# Patient Record
Sex: MALE | Race: WHITE | Employment: OTHER | URBAN - METROPOLITAN AREA
[De-identification: names, ages, dates, MRNs, and addresses within clinical notes are randomized per-mention and may not be internally consistent; named-entity substitution may affect disease eponyms.]

---

## 2017-05-04 PROBLEM — M79.642 LEFT HAND PAIN: Status: ACTIVE | Noted: 2017-05-04

## 2017-05-04 PROBLEM — M10.9 ACUTE GOUT OF LEFT HAND: Status: ACTIVE | Noted: 2017-05-04

## 2017-08-07 ENCOUNTER — HOSPITAL ENCOUNTER (OUTPATIENT)
Dept: CT IMAGING | Age: 72
Discharge: HOME | End: 2017-08-07
Attending: INTERNAL MEDICINE | Admitting: INTERNAL MEDICINE

## 2017-08-07 DIAGNOSIS — R52 PAIN: ICD-10-CM

## 2017-08-07 DIAGNOSIS — M13.10 MONOARTHRITIS, NOT ELSEWHERE CLASSIFIED, UNSPECIFIED SITE: ICD-10-CM

## 2017-08-07 DIAGNOSIS — M25.50 ARTHRALGIA, UNSPECIFIED JOINT: ICD-10-CM

## 2018-07-27 ENCOUNTER — HOSPITAL ENCOUNTER (OUTPATIENT)
Dept: WOMENS IMAGING | Age: 73
Discharge: HOME | End: 2018-07-27
Attending: FAMILY MEDICINE | Admitting: FAMILY MEDICINE

## 2018-07-27 DIAGNOSIS — M13.10: ICD-10-CM

## 2018-07-27 DIAGNOSIS — M81.0 OSTEOPOROSIS, UNSPECIFIED OSTEOPOROSIS TYPE, UNSPECIFIED PATHOLOGICAL FRACTURE PRESENCE: ICD-10-CM

## 2021-03-12 ENCOUNTER — HOSPITAL ENCOUNTER (OUTPATIENT)
Dept: CT IMAGING | Age: 76
Discharge: HOME | End: 2021-03-12
Payer: MEDICARE

## 2021-03-12 ENCOUNTER — HOSPITAL ENCOUNTER (OUTPATIENT)
Dept: WOMENS IMAGING | Age: 76
Discharge: HOME | End: 2021-03-12
Payer: MEDICARE

## 2021-03-12 DIAGNOSIS — Z00.00 MEDICARE ANNUAL WELLNESS VISIT, SUBSEQUENT: ICD-10-CM

## 2021-03-12 DIAGNOSIS — J44.9 OBSTRUCTIVE CHRONIC BRONCHITIS WITHOUT EXACERBATION (HCC): ICD-10-CM

## 2021-03-12 DIAGNOSIS — M81.0 OSTEOPOROSIS, UNSPECIFIED OSTEOPOROSIS TYPE, UNSPECIFIED PATHOLOGICAL FRACTURE PRESENCE: ICD-10-CM

## 2021-03-12 PROCEDURE — 77080 DXA BONE DENSITY AXIAL: CPT

## 2021-03-12 PROCEDURE — 71250 CT THORAX DX C-: CPT

## 2021-03-26 ENCOUNTER — TELEPHONE (OUTPATIENT)
Dept: PULMONOLOGY | Age: 76
End: 2021-03-26

## 2021-03-26 ENCOUNTER — HOSPITAL ENCOUNTER (OUTPATIENT)
Dept: PET IMAGING | Age: 76
Discharge: HOME | End: 2021-03-26
Payer: MEDICARE

## 2021-03-26 DIAGNOSIS — R91.8 LUNG NODULES: ICD-10-CM

## 2021-03-26 PROCEDURE — A9552 F18 FDG: HCPCS | Performed by: FAMILY MEDICINE

## 2021-03-26 PROCEDURE — 3430000000 HC RX DIAGNOSTIC RADIOPHARMACEUTICAL: Performed by: FAMILY MEDICINE

## 2021-03-26 PROCEDURE — 78815 PET IMAGE W/CT SKULL-THIGH: CPT

## 2021-03-26 RX ORDER — FLUDEOXYGLUCOSE F 18 200 MCI/ML
12.73 INJECTION, SOLUTION INTRAVENOUS
Status: COMPLETED | OUTPATIENT
Start: 2021-03-26 | End: 2021-03-26

## 2021-03-26 RX ADMIN — FLUDEOXYGLUCOSE F 18 12.73 MILLICURIE: 200 INJECTION, SOLUTION INTRAVENOUS at 08:04

## 2021-03-26 NOTE — TELEPHONE ENCOUNTER
Patient was referred to us with results of abnormal CT in his chart. He is scheduled for a PET today (3/26) Please review referral and advise on urgency of appointment.      Referral scanned

## 2021-03-29 ENCOUNTER — HOSPITAL ENCOUNTER (EMERGENCY)
Age: 76
Discharge: HOME | End: 2021-03-29
Payer: MEDICARE

## 2021-03-29 ENCOUNTER — APPOINTMENT (OUTPATIENT)
Dept: GENERAL RADIOLOGY | Age: 76
End: 2021-03-29
Payer: MEDICARE

## 2021-03-29 VITALS
RESPIRATION RATE: 18 BRPM | TEMPERATURE: 99.4 F | DIASTOLIC BLOOD PRESSURE: 87 MMHG | OXYGEN SATURATION: 95 % | HEART RATE: 89 BPM | BODY MASS INDEX: 19.64 KG/M2 | SYSTOLIC BLOOD PRESSURE: 176 MMHG | WEIGHT: 133 LBS

## 2021-03-29 DIAGNOSIS — M17.11 PRIMARY OSTEOARTHRITIS OF RIGHT KNEE: ICD-10-CM

## 2021-03-29 DIAGNOSIS — M25.461 KNEE EFFUSION, RIGHT: Primary | ICD-10-CM

## 2021-03-29 PROCEDURE — 73560 X-RAY EXAM OF KNEE 1 OR 2: CPT

## 2021-03-29 PROCEDURE — 99284 EMERGENCY DEPT VISIT MOD MDM: CPT

## 2021-03-29 PROCEDURE — 73562 X-RAY EXAM OF KNEE 3: CPT

## 2021-03-29 RX ORDER — PREDNISONE 10 MG/1
40 TABLET ORAL DAILY
Qty: 20 TABLET | Refills: 0 | Status: SHIPPED | OUTPATIENT
Start: 2021-03-29 | End: 2021-04-03

## 2021-03-29 ASSESSMENT — PAIN DESCRIPTION - PAIN TYPE: TYPE: ACUTE PAIN

## 2021-03-29 ASSESSMENT — PAIN DESCRIPTION - FREQUENCY: FREQUENCY: CONTINUOUS

## 2021-03-29 ASSESSMENT — PAIN DESCRIPTION - ONSET: ONSET: ON-GOING

## 2021-03-29 ASSESSMENT — PAIN SCALES - GENERAL
PAINLEVEL_OUTOF10: 7
PAINLEVEL_OUTOF10: 10

## 2021-03-29 ASSESSMENT — PAIN DESCRIPTION - DESCRIPTORS: DESCRIPTORS: CONSTANT

## 2021-03-29 ASSESSMENT — PAIN DESCRIPTION - ORIENTATION: ORIENTATION: RIGHT

## 2021-03-29 ASSESSMENT — PAIN DESCRIPTION - PROGRESSION
CLINICAL_PROGRESSION: NOT CHANGED
CLINICAL_PROGRESSION: NOT CHANGED

## 2021-03-29 NOTE — ED PROVIDER NOTES
[address] [place]        Pt Name: Suni Vegas  MRN: [id number]  [place] 1945  Date of evaluation: 3/29/2021  Provider: Kati Cope PA-C  PCP: Bree SILVA. I have evaluated this patient. My supervising physician was available for consultation. Karolina Rudd MD      CHIEF COMPLAINT       Chief Complaint   Patient presents with    Knee Pain     pt reports r knee swelling x1 day. HISTORY OF PRESENT ILLNESS   (Location, Timing/Onset, Context/Setting, Quality, Duration, Modifying Factors, Severity, Associated Signs and Symptoms)  Note limiting factors. Suni Vegas is a 67 y.o. male patient resenting with 2-3-day history of progressive pain and swelling unable to fully extend the right knee. History arthritis. He does see rheumatology Dr. Handy Gonzales. I do find evidence of inflammatory arthritic diagnosis. Considering early onset RA for rheumatology note February 5, 2019. At this time regarding the patient's right knee. This is atraumatic. No overlying erythema or warmth. No fevers or chills reported by the patient. Nursing Notes were all reviewed and agreed with or any disagreements were addressed in the HPI. REVIEW OF SYSTEMS    (2-9 systems for level 4, 10 or more for level 5)     Review of Systems    Positives and Pertinent negatives as per HPI. Except as noted above in the ROS, all other systems were reviewed and negative.        PAST MEDICAL HISTORY     Past Medical History:   Diagnosis Date    Arthritis     [facility]     Dizziness 1/24/2014    Emphysema lung ([facility]     Hypertension          SURGICAL HISTORY     Past Surgical History:   Procedure Laterality Date    EYE SURGERY      TONSILLECTOMY           CURRENTMEDICATIONS       Previous Medications    ACETAMINOPHEN (TYLENOL) 500 MG TABLET    Take 500 mg by mouth every 6 hours as needed for Pain    ALBUTEROL (PROVENTIL) (2.5 MG/3ML) 0.083% NEBULIZER SOLUTION    Take 2.5 mg by nebulization every 6 hours as needed for Wheezing    ASPIRIN 81 MG TABLET    Take 81 mg by mouth daily. BIOTIN 10 MG CAPS    Take 1 capsule by mouth daily    CYANOCOBALAMIN (B-12) 1000 MCG CAPS    Take 0.5 capsules by mouth daily    FLUOCINONIDE (LIDEX) 0.05 % OINTMENT    Apply 2 applicators topically 2 times daily as needed (dry skin on arms and legs). Apply topically 2 times daily. FLUTICASONE-SALMETEROL (ADVAIR DISKUS IN)    Inhale into the lungs 2 times daily    HYDROXYZINE (ATARAX) 25 MG TABLET    Take 25 mg by mouth nightly as needed for Itching    LISINOPRIL-HYDROCHLOROTHIAZIDE (PRINZIDE;ZESTORETIC) 20-25 MG PER TABLET    Take 1 tablet by mouth daily    OXYCODONE-ACETAMINOPHEN (PERCOCET) 5-325 MG PER TABLET    Take 1 tablet by mouth every 6 hours as needed for Pain . TIOTROPIUM (SPIRIVA) 18 MCG INHALATION CAPSULE    Inhale 18 mcg into the lungs daily         ALLERGIES     Patient has no known allergies. FAMILYHISTORY       Family History   Problem Relation Age of Onset    Coronary Art Dis Father     Heart Disease Father     Hypertension Mother     Cancer Mother         Leukemia    Other Mother         migraines          SOCIAL HISTORY       Social History     Tobacco Use    Smoking status: Former Smoker     Packs/day: 3.00     Years: 50.00     Pack years: 150.00     Quit date: 2007     Years since quittin.1    Smokeless tobacco: Never Used   Substance Use Topics    Alcohol use: No    Drug use: No       SCREENINGS             PHYSICAL EXAM    (up to 7 for level 4, 8 or more for level 5)     ED Triage Vitals [21 1656]   BP Temp Temp Source Pulse Resp SpO2 Height Weight   (!) 174/90 99.6 °F (37.6 °C) Oral 88 18 99 % -- 133 lb (60.3 kg)       Physical Exam  Vitals signs and nursing note reviewed. Constitutional:       Appearance: Normal appearance.  He is well-developed and normal weight. HENT:      Head: Normocephalic and atraumatic. Right Ear: External ear normal.      Left Ear: External ear normal.   Eyes:      General: No scleral icterus. Right eye: No discharge. Left eye: No discharge. Conjunctiva/sclera: Conjunctivae normal.   Neck:      Musculoskeletal: Normal range of motion and neck supple. Cardiovascular:      Rate and Rhythm: Normal rate. Pulmonary:      Effort: Pulmonary effort is normal.   Musculoskeletal:         General: Swelling and tenderness present. Comments: Limited range of motion secondary to pain and effusion. Appears to have a moderate effusion. No overlying skin warmth erythema. Skin:     General: Skin is warm and dry. Neurological:      General: No focal deficit present. Mental Status: He is alert and oriented to person, place, and time. Mental status is at baseline. Psychiatric:         Mood and Affect: Mood normal.         Behavior: Behavior normal.         Thought Content: Thought content normal.         Judgment: Judgment normal.         DIAGNOSTIC RESULTS   LABS:    Labs Reviewed - No data to display    All other labs were within normal range or not returned as of this dictation. EKG: All EKG's are interpreted by the Emergency Department Physician in the absence of a cardiologist.  Please see their note for interpretation of EKG. RADIOLOGY:   Non-plain film images such as CT, Ultrasound and MRI are read by the radiologist. Plain radiographic images are visualized and preliminarily interpreted by the ED Provider with the below findings:        Interpretation per the Radiologist below, if available at the time of this note:    XR KNEE RIGHT (1-2 VIEWS)   Final Result   Joint effusion. Severe lateral compartment narrowing.            Pet Ct Skull Base To Mid Thigh    Result Date: 3/26/2021  EXAMINATION: Skull base to midthigh PET/CT 3/26/2021 TECHNIQUE: Following IV injection of 12.73 F 18-FDG per protocol, PET  tumor imaging was acquired from the base of the skull to the mid thighs. Computed tomography was used for purposes of attenuation correction and anatomic localization. Fusion imaging was utilized for interpretation. Blood glucose was 102 mg/dL. Uptake time was 52 minutes COMPARISON: Chest CT dated 03/12/2021 HISTORY: ORDERING SYSTEM PROVIDED HISTORY: Lung nodules TECHNOLOGIST PROVIDED HISTORY: Reason for Exam: lung nodule Acuity: Unknown Type of Exam: Initial FINDINGS: Diffuse muscular activity is seen within the neck, extremities, chest, and abdomen. HEAD/NECK: In the craniocervical region, physiologic activity is favored, including within the vocal cords. CHEST: Calcification of the thoracic aorta. Coronary artery calcification. Calcified mediastinal and hilar lymph nodes, in keeping with granulomatous disease. Within the mediastinum, no patrick activity markedly greater than mediastinal background. No axillary adenopathy. Emphysema is present. Lateral left apical pleuroparenchymal thickening is seen measuring approximately 2.1 x 2.0 cm with associated metabolic activity, SUV maximum 2.7. Recent described 1 cm right upper lobe pulmonary nodule is not markedly metabolic, SUV maximum 1.5. Additional smaller nodules are too small to characterize by PET. Calcified granuloma within the anterior right upper lobe. Scattered ground-glass opacity bilaterally, likely atelectasis. No pneumothorax or pleural effusion. ABDOMEN/PELVIS: Excretion of activity is seen from bilateral kidneys and activity is seen within the urinary bladder. Bowel activity seen which can be physiologically variable. Calcified granulomas within liver and spleen. Calcification of the abdominal aorta and iliac arteries. Diverticulosis.      Pleuroparenchymal thickening within the lateral left upper lobe does demonstrate metabolic activity, to a degree for which malignancy is a differential consideration given the underlying emphysema. 1 cm right upper lobe pulmonary nodule is not significantly hypermetabolic. Additional smaller pulmonary nodules are too small to characterize by PET. Continued attention to these nodules on CT follow-up is suggested. Diffuse muscular activity bilaterally, which can be due to high insulin state, recent meal, or vigorous exertion preceding the exam.           PROCEDURES   Unless otherwise noted below, none     Procedures    CRITICAL CARE TIME   N/A    CONSULTS:  None      EMERGENCY DEPARTMENT COURSE and DIFFERENTIAL DIAGNOSIS/MDM:   Vitals:    Vitals:    03/29/21 1656   BP: (!) 174/90   Pulse: 88   Resp: 18   Temp: 99.6 °F (37.6 °C)   TempSrc: Oral   SpO2: 99%   Weight: 133 lb (60.3 kg)       Patient was given the following medications:  Medications - No data to display        Presenting with atraumatic effusion right knee. Known arthritis. X-ray shows degenerative change with effusion. Soft cast applied. He has crutches. Start prednisone 40 mg daily x5 days. Refer to orthopedist this week. I did recommend Tylenol 1000 mg 3 times daily for additional pain control. The patient does express understanding of his diagnosis and the treatment plan. FINAL IMPRESSION      1. Knee effusion, right    2.  Primary osteoarthritis of right knee          DISPOSITION/PLAN   DISPOSITION Decision To Discharge 03/29/2021 05:58:24 PM      PATIENT REFERREDTO:  Wes Pozo, [facility] [address] [place] [address], [address]  [phone]    Schedule an appointment as soon as possible for a visit in 3 days      Quoc Morales  [address]  [phone]    Schedule an appointment as soon as possible for a visit   As needed    [facility]  [address] [address]  [phone]  Go to   If symptoms worsen      DISCHARGE MEDICATIONS:  New Prescriptions    PREDNISONE (DELTASONE) 10 MG TABLET    Take 4 tablets by mouth daily for 5 doses       DISCONTINUED MEDICATIONS:  Discontinued Medications    PREDNISONE (DELTASONE) 20 MG TABLET    Take 3 tabs together in the morning for 3 days, then 2 tabs together in the morning for 3 days, the 1 tab in the morning for 3 days. (Please note that portions of this note were completed with a voice recognition program.  Efforts were made to edit the dictations but occasionally words are mis-transcribed. )    Sarah Fu PA-C (electronically signed)      \     Sarah Fu PA-C  03/29/21 Won Wiseman

## 2021-03-30 ENCOUNTER — OFFICE VISIT (OUTPATIENT)
Dept: PULMONOLOGY | Age: 76
End: 2021-03-30
Payer: MEDICARE

## 2021-03-30 ENCOUNTER — TELEPHONE (OUTPATIENT)
Dept: PULMONOLOGY | Age: 76
End: 2021-03-30

## 2021-03-30 VITALS
HEIGHT: 69 IN | WEIGHT: 131 LBS | HEART RATE: 62 BPM | DIASTOLIC BLOOD PRESSURE: 52 MMHG | OXYGEN SATURATION: 92 % | SYSTOLIC BLOOD PRESSURE: 138 MMHG | TEMPERATURE: 97 F | BODY MASS INDEX: 19.4 KG/M2 | RESPIRATION RATE: 16 BRPM

## 2021-03-30 DIAGNOSIS — R91.8 LUNG NODULES: ICD-10-CM

## 2021-03-30 DIAGNOSIS — R93.89 ABNORMAL CT OF THE CHEST: Primary | ICD-10-CM

## 2021-03-30 DIAGNOSIS — J43.1 PANLOBULAR EMPHYSEMA (HCC): ICD-10-CM

## 2021-03-30 PROCEDURE — 3023F SPIROM DOC REV: CPT | Performed by: INTERNAL MEDICINE

## 2021-03-30 PROCEDURE — G8420 CALC BMI NORM PARAMETERS: HCPCS | Performed by: INTERNAL MEDICINE

## 2021-03-30 PROCEDURE — 1036F TOBACCO NON-USER: CPT | Performed by: INTERNAL MEDICINE

## 2021-03-30 PROCEDURE — G8427 DOCREV CUR MEDS BY ELIG CLIN: HCPCS | Performed by: INTERNAL MEDICINE

## 2021-03-30 PROCEDURE — G8926 SPIRO NO PERF OR DOC: HCPCS | Performed by: INTERNAL MEDICINE

## 2021-03-30 PROCEDURE — 1123F ACP DISCUSS/DSCN MKR DOCD: CPT | Performed by: INTERNAL MEDICINE

## 2021-03-30 PROCEDURE — G8484 FLU IMMUNIZE NO ADMIN: HCPCS | Performed by: INTERNAL MEDICINE

## 2021-03-30 PROCEDURE — 4040F PNEUMOC VAC/ADMIN/RCVD: CPT | Performed by: INTERNAL MEDICINE

## 2021-03-30 PROCEDURE — 99204 OFFICE O/P NEW MOD 45 MIN: CPT | Performed by: INTERNAL MEDICINE

## 2021-03-30 RX ORDER — CHLORAL HYDRATE 500 MG
3000 CAPSULE ORAL 3 TIMES DAILY
COMMUNITY
End: 2021-05-24 | Stop reason: ALTCHOICE

## 2021-03-30 NOTE — TELEPHONE ENCOUNTER
Patient scheduled for CT lung biopsy for Monday, April 5th at 8am, 6:30 am arrival time. Patient wrote down all pre procedure instructions. He verbalized understanding and will be there Monday.

## 2021-03-30 NOTE — PROGRESS NOTES
PATIENT IS SEEN AT THE REQUEST OF DR. Doan for Irene Sandhu    Chief complaint  This is a 67y.o. year old male  who comes to see me with a chief complaint of   Chief Complaint   Patient presents with    New Patient       HPI  Here with a cc of abnormal CT Chest     Recently had lung cancer screening with abnormal CT Chest which necessitated a PET scan. He said he is breathing fine and has no lung issues. I then inquired about his inhalers and he said he has been on them for about 7 years or so. Said he also did PFTs in the past.  He does not get SOB. He has some coughing but overall does well. He quit tobacco about 9 years ago. Last CT he had was in .   Daughter is present       Occupation:  Factory (furniture)    Pets: Birds (since august(    Hobbies/Exposures: None    TB Exposure:  None    Lung Procedures:  None      Past Medical History:   Diagnosis Date    Arthritis     [facility]) [facility] [address]     Dizziness 2014    Emphysema lung ([facility] [address]     Hypertension        Past Surgical History:   Procedure Laterality Date    EYE SURGERY      TONSILLECTOMY         Social History     Socioeconomic History    Marital status:      Spouse name: Not on file    Number of children: 2    Years of education: Not on file    Highest education level: Not on file   Occupational History    Occupation: retired   Social Needs    Financial resource strain: Not on file    Food insecurity     Worry: Not on file     Inability: Not on file   [employer] needs     Medical: Not on file     Non-medical: Not on file   Tobacco Use    Smoking status: Former Smoker     Packs/day: 3.00     Years: 50.00     Pack years: 150.00     Quit date: 2007     Years since quittin.1    Smokeless tobacco: Never Used   Substance and Sexual Activity    Alcohol use: No    Drug use: No    Sexual activity: Not Currently   Lifestyle    Physical activity     Days per week: Not on file Minutes per session: Not on file    Stress: Not on file   Relationships    Social connections     Talks on phone: Not on file     Gets together: Not on file     Attends Yarsanism service: Not on file     Active member of club or organization: Not on file     Attends meetings of clubs or organizations: Not on file     Relationship status: Not on file    Intimate partner violence     Fear of current or ex partner: Not on file     Emotionally abused: Not on file     Physically abused: Not on file     Forced sexual activity: Not on file   Other Topics Concern    Not on file   Social History Narrative    Not on file       Family History   Problem Relation Age of Onset    Coronary Art Dis Father     Heart Disease Father     Hypertension Mother     Cancer Mother         Leukemia    Other Mother         migraines         Current Outpatient Medications:     Omega-3 Fatty Acids (FISH OIL) 1000 MG CAPS, Take 3,000 mg by mouth 3 times daily, Disp: , Rfl:     predniSONE (DELTASONE) 10 MG tablet, Take 4 tablets by mouth daily for 5 doses, Disp: 20 tablet, Rfl: 0    oxyCODONE-acetaminophen (PERCOCET) 5-325 MG per tablet, Take 1 tablet by mouth every 6 hours as needed for Pain ., Disp: 12 tablet, Rfl: 0    hydrOXYzine (ATARAX) 25 MG tablet, Take 25 mg by mouth nightly as needed for Itching, Disp: , Rfl:     lisinopril-hydrochlorothiazide (PRINZIDE;ZESTORETIC) 20-25 MG per tablet, Take 1 tablet by mouth daily, Disp: , Rfl:     acetaminophen (TYLENOL) 500 MG tablet, Take 500 mg by mouth every 6 hours as needed for Pain, Disp: , Rfl:     Cyanocobalamin (B-12) 1000 MCG CAPS, Take 0.5 capsules by mouth daily, Disp: , Rfl:     tiotropium (SPIRIVA) 18 MCG inhalation capsule, Inhale 18 mcg into the lungs daily, Disp: , Rfl:     albuterol (PROVENTIL) (2.5 MG/3ML) 0.083% nebulizer solution, Take 2.5 mg by nebulization every 6 hours as needed for Wheezing, Disp: , Rfl:     Fluticasone-Salmeterol (ADVAIR DISKUS IN), Inhale into the lungs 2 times daily, Disp: , Rfl:     fluocinonide (LIDEX) 0.05 % ointment, Apply 2 applicators topically 2 times daily as needed (dry skin on arms and legs). Apply topically 2 times daily. , Disp: , Rfl:     aspirin 81 MG tablet, Take 81 mg by mouth daily. , Disp: , Rfl:     Biotin 10 MG CAPS, Take 1 capsule by mouth daily, Disp: , Rfl:       ROS:  GENERAL:  No fevers, chills, or night sweats  HEENT:  No double vision, blurry vision, or difficulty swallowing  HEART:  No chest pain, no palpitations  LUNGS:  Some SOB, some coughing  ABDOMEN:  No abdominal pains, no changes in stools  GENITOURINARY:  No increased frequency, no blood in urine  EXTREMITIES: Knee pain   NEURO:  No numbness or tingling, no seizures  SKIN:  No new rashes, no changes in hair or nails  LYMPH:  No masses, no swelling neck, armpits, or groin    PHYSICAL EXAM:  Vitals:    03/30/21 1056   BP: (!) 138/52   Pulse: 62   Resp: 16   Temp: 97 °F (36.1 °C)   SpO2: 92%       GENERAL:  Well nourished, alert, appears stated age, no distress, thin  HEENT:  No scleral icterus, no conjunctival irritation. Mallampati III  NECK:  No thyromegaly, no bruits  LYMPH:  No cervical or supraclavicular adenopathy  HEART:  Regular rate and rhythm, no murmurs  LUNGS:  Essentially clear   ABDOMEN:  No distention, no organomegaly  EXTREMITIES:  No edema, no digital clubbing  NEURO:  No localizing deficits, CN II-XII intact    Pulmonary Function Testing  None    Chest imaging from 3/2021 is reviewed. My interpretation is diffuse emphysema, ANDREA cavitary process with lateral/peripheral thickening and scattered nodules. RUL scarring/fibrosis.   CT from 2015 showed bilateral upper lobe airspace disease from presumed infection at that time     PET 3/21  Pleuroparenchymal thickening within the lateral left upper lobe does   demonstrate metabolic activity, to a degree for which malignancy is a   differential consideration given the underlying emphysema.       1 cm right upper lobe pulmonary nodule is not significantly hypermetabolic. Additional smaller pulmonary nodules are too small to characterize by PET. Continued attention to these nodules on CT follow-up is suggested.       Diffuse muscular activity bilaterally, which can be due to high insulin   state, recent meal, or vigorous exertion preceding the exam.       ECHO  None  Lab Results   Component Value Date    WBC 9.0 05/05/2017    HGB 12.0 (L) 05/05/2017    HCT 35.3 (L) 05/05/2017    MCV 88.8 05/05/2017     05/05/2017       No results found for: BNP    Lab Results   Component Value Date    CREATININE 1.1 05/05/2017    BUN 34 (H) 05/05/2017     (L) 05/05/2017    K 4.5 05/05/2017    CL 97 (L) 05/05/2017    CO2 24 05/05/2017       I reviewed above labs and studies pertinent to this visit and date    Assessment/Plan:  1. Abnormal CT of the chest  Most concerning part is the ANDREA cavitary process. There was evidence of airspace disease in the ANDREA back in 2015, and it appeared more consolidated, like an infection. Now he has volume loss in the upper lobe with thick cavity. This could be slow growing neoplasm vs scarring vs infection. SUV was only 2.7, but I suspect this needs to be biopsied. Will ask IR For a CT guided biopsy of the ANDREA cavity for definitive diagnosis. 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 Panlobular emphysema ([address]  Noted diffusely. He said he had PFTs in the past.  I am not sure. He can continue with triple inhaler therapy as is    3. Lung nodules  Unclear. Has been around birds for most of his life, some are calcified. Will need to be monitored with follow up CT in 3 months.   The small ones are below the threshold of the PET and the largest one was minimally PET AVID    Follow up in 6-8 weeks  May need PFTs    IR approved CT guided biopsy via Dr. Isra Kim     Consultation note well will be sent to referring physician regarding findings and plan.

## 2021-03-30 NOTE — LETTER
[facility]mon[facility] Sleep [facility]  [facility] [address]   Phone: [phone]  Fax: [address], DO        March 30, 2021     [employer] [place] [address] [facility],[address] [id number]    Patient: Breonna Wagner  MR Number: [id number]  YOB: 1945  Date of Visit: 3/30/2021    Dear Dr. Jonathan Zee: Thank you for the request for consultation for Amy Loyola to me for the evaluation of abnormal CT Chest. Below are the relevant portions of my assessment and plan of care. 1. Abnormal CT of the chest  Most concerning part is the ANDREA cavitary process. There was evidence of airspace disease in the ANDREA back in 2015, and it appeared more consolidated, like an infection. Now he has volume loss in the upper lobe with thick cavity. This could be slow growing neoplasm vs scarring vs infection. SUV was only 2.7, but I suspect this needs to be biopsied. Will ask IR For a CT guided biopsy of the ANDREA cavity for definitive diagnosis. Mucinous type adenocarcinoma can be very slow growing    2. Panlobular emphysema ([address]  Noted diffusely. He said he had PFTs in the past.  I am not sure. He can continue with triple inhaler therapy as is    3. Lung nodules  Unclear. Has been around birds for most of his life, some are calcified. Will need to be monitored with follow up CT in 3 months. The small ones are below the threshold of the PET and the largest one was minimally PET AVID    Follow up in 6-8 weeks  May need PFTs      If you have questions, please do not hesitate to call me. I look forward to following Anthony Jain along with you.     Sincerely,        Etienne Geronimo, DO

## 2021-04-05 ENCOUNTER — HOSPITAL ENCOUNTER (OUTPATIENT)
Dept: GENERAL RADIOLOGY | Age: 76
Discharge: HOME | End: 2021-04-05
Payer: MEDICARE

## 2021-04-05 ENCOUNTER — HOSPITAL ENCOUNTER (OUTPATIENT)
Dept: CT IMAGING | Age: 76
Discharge: HOME | End: 2021-04-05
Payer: MEDICARE

## 2021-04-05 VITALS
HEART RATE: 81 BPM | WEIGHT: 133.16 LBS | RESPIRATION RATE: 18 BRPM | HEIGHT: 69 IN | OXYGEN SATURATION: 95 % | DIASTOLIC BLOOD PRESSURE: 86 MMHG | BODY MASS INDEX: 19.72 KG/M2 | SYSTOLIC BLOOD PRESSURE: 135 MMHG | TEMPERATURE: 97.8 F

## 2021-04-05 DIAGNOSIS — R93.89 ABNORMAL CT OF THE CHEST: ICD-10-CM

## 2021-04-05 LAB
BASOPHILS ABSOLUTE: 0.1 K/UL (ref 0–0.2)
BASOPHILS RELATIVE PERCENT: 0.9 %
EOSINOPHILS ABSOLUTE: 0.2 K/UL (ref 0–0.6)
EOSINOPHILS RELATIVE PERCENT: 1.6 %
HCT VFR BLD CALC: 39 % (ref 40.5–52.5)
HEMOGLOBIN: 13.2 G/DL (ref 13.5–17.5)
INR BLD: 0.87 (ref 0.86–1.14)
LYMPHOCYTES ABSOLUTE: 3.2 K/UL (ref 1–5.1)
LYMPHOCYTES RELATIVE PERCENT: 22.2 %
MCH RBC QN AUTO: 29 PG (ref 26–34)
MCHC RBC AUTO-ENTMCNC: 33.8 G/DL (ref 31–36)
MCV RBC AUTO: 85.6 FL (ref 80–100)
MONOCYTES ABSOLUTE: 0.8 K/UL (ref 0–1.3)
MONOCYTES RELATIVE PERCENT: 5.8 %
NEUTROPHILS ABSOLUTE: 9.9 K/UL (ref 1.7–7.7)
NEUTROPHILS RELATIVE PERCENT: 69.5 %
PDW BLD-RTO: 16.3 % (ref 12.4–15.4)
PLATELET # BLD: 275 K/UL (ref 135–450)
PMV BLD AUTO: 6.1 FL (ref 5–10.5)
PROTHROMBIN TIME: 10.1 SEC (ref 10–13.2)
RBC # BLD: 4.55 M/UL (ref 4.2–5.9)
SARS-COV-2, NAAT: NOT DETECTED
WBC # BLD: 14.2 K/UL (ref 4–11)

## 2021-04-05 PROCEDURE — 7100000011 HC PHASE II RECOVERY - ADDTL 15 MIN

## 2021-04-05 PROCEDURE — 88342 IMHCHEM/IMCYTCHM 1ST ANTB: CPT

## 2021-04-05 PROCEDURE — 71045 X-RAY EXAM CHEST 1 VIEW: CPT

## 2021-04-05 PROCEDURE — 85610 PROTHROMBIN TIME: CPT

## 2021-04-05 PROCEDURE — 88333 PATH CONSLTJ SURG CYTO XM 1: CPT

## 2021-04-05 PROCEDURE — 36415 COLL VENOUS BLD VENIPUNCTURE: CPT

## 2021-04-05 PROCEDURE — 85025 COMPLETE CBC W/AUTO DIFF WBC: CPT

## 2021-04-05 PROCEDURE — 87635 SARS-COV-2 COVID-19 AMP PRB: CPT

## 2021-04-05 PROCEDURE — 2709999900 CT NEEDLE BIOPSY LUNG PERCUTANEOUS W IMAGING GUIDANCE

## 2021-04-05 PROCEDURE — 77012 CT SCAN FOR NEEDLE BIOPSY: CPT

## 2021-04-05 PROCEDURE — 6360000002 HC RX W HCPCS: Performed by: RADIOLOGY

## 2021-04-05 PROCEDURE — 88305 TISSUE EXAM BY PATHOLOGIST: CPT

## 2021-04-05 PROCEDURE — 7100000010 HC PHASE II RECOVERY - FIRST 15 MIN

## 2021-04-05 RX ORDER — ACETAMINOPHEN 325 MG/1
650 TABLET ORAL EVERY 4 HOURS PRN
Status: DISCONTINUED | OUTPATIENT
Start: 2021-04-05 | End: 2021-04-06 | Stop reason: HOSPADM

## 2021-04-05 RX ORDER — OMEPRAZOLE 10 MG/1
10 CAPSULE, DELAYED RELEASE ORAL DAILY
COMMUNITY

## 2021-04-05 RX ORDER — IBUPROFEN 200 MG
200 TABLET ORAL EVERY 6 HOURS PRN
COMMUNITY

## 2021-04-05 RX ORDER — FENTANYL CITRATE 50 UG/ML
INJECTION, SOLUTION INTRAMUSCULAR; INTRAVENOUS DAILY PRN
Status: COMPLETED | OUTPATIENT
Start: 2021-04-05 | End: 2021-04-05

## 2021-04-05 RX ORDER — MIDAZOLAM HYDROCHLORIDE 1 MG/ML
INJECTION INTRAMUSCULAR; INTRAVENOUS DAILY PRN
Status: COMPLETED | OUTPATIENT
Start: 2021-04-05 | End: 2021-04-05

## 2021-04-05 RX ADMIN — FENTANYL CITRATE 25 MCG: 50 INJECTION INTRAMUSCULAR; INTRAVENOUS at 08:21

## 2021-04-05 RX ADMIN — MIDAZOLAM 0.5 MG: 1 INJECTION INTRAMUSCULAR; INTRAVENOUS at 08:27

## 2021-04-05 RX ADMIN — MIDAZOLAM 0.5 MG: 1 INJECTION INTRAMUSCULAR; INTRAVENOUS at 08:21

## 2021-04-05 RX ADMIN — FENTANYL CITRATE 25 MCG: 50 INJECTION INTRAMUSCULAR; INTRAVENOUS at 08:27

## 2021-04-05 ASSESSMENT — PAIN SCALES - GENERAL
PAINLEVEL_OUTOF10: 0
PAINLEVEL_OUTOF10: 0

## 2021-04-05 ASSESSMENT — PAIN - FUNCTIONAL ASSESSMENT: PAIN_FUNCTIONAL_ASSESSMENT: 0-10

## 2021-04-05 NOTE — BRIEF OP NOTE
Brief Operative Note      Patient: Jamila Whipple MRN: [id number]     YOB: 1945  Age: 67 y.o. Sex: male        DATE OF PROCEDURE: 4/5/2021     PROCEDURE PERFORMED: ANDREA core biopsy. SURGEON: eCcilio Corona MD    ANESTHESIA: Fentanyl, Versed    Estimated Blood Loss:none    Complications: None. Device implanted: None. Specimen: 1 cm 18 gauge core ANDREA nodule PET positive focus.     Electronically signed by Cecilio Corona MD on 4/5/2021 at 8:43 AM

## 2021-04-05 NOTE — PRE SEDATION
Sedation Pre-Procedure Note    Patient Name: Angel Thakkar   YOB: 1945  Room/Bed: Room/bed info not found  Medical Record Number: [id number]  Date: 4/5/2021   Time: 8:19 AM       Indication:  ANDREA lung core biopsy    Consent: I have discussed with the patient and/or the patient representative the indication, alternatives, and the possible risks and/or complications of the planned procedure and the anesthesia methods. The patient and/or patient representative appear to understand and agree to proceed. Vital Signs:   Vitals:    04/05/21 0814   BP: (!) 162/74   Pulse: 84   Resp: 20   Temp:    SpO2: 100%       Past Medical History:   has a past medical history of Arthritis, COPD (chronic obstructive pulmonary disease) ([facility] [address] Dizziness, Emphysema lung ([facility] [address] and Hypertension. Past Surgical History:   has a past surgical history that includes eye surgery and Tonsillectomy. Medications:   Scheduled Meds:   Continuous Infusions:   PRN Meds:   Home Meds:   Prior to Admission medications    Medication Sig Start Date End Date Taking?  Authorizing Provider   ibuprofen (ADVIL;MOTRIN) 200 MG tablet Take 200 mg by mouth every 6 hours as needed for Pain   Yes Historical Provider, MD   omeprazole (PRILOSEC) 10 MG delayed release capsule Take 10 mg by mouth daily   Yes Historical Provider, MD   hydrOXYzine (ATARAX) 25 MG tablet Take 25 mg by mouth nightly as needed for Itching   Yes Historical Provider, MD   lisinopril-hydrochlorothiazide (PRINZIDE;ZESTORETIC) 20-25 MG per tablet Take 1 tablet by mouth daily   Yes Historical Provider, MD   tiotropium (SPIRIVA) 18 MCG inhalation capsule Inhale 18 mcg into the lungs daily   Yes Historical Provider, MD   albuterol (PROVENTIL) (2.5 MG/3ML) 0.083% nebulizer solution Take 2.5 mg by nebulization every 6 hours as needed for Wheezing   Yes Historical Provider, MD   Fluticasone-Salmeterol (ADVAIR DISKUS IN) Inhale into the lungs 2 times daily   Yes Historical Provider, MD   Omega-3 Fatty Acids (FISH OIL) 1000 MG CAPS Take 3,000 mg by mouth 3 times daily    Historical Provider, MD   acetaminophen (TYLENOL) 500 MG tablet Take 500 mg by mouth every 6 hours as needed for Pain    Historical Provider, MD   Cyanocobalamin (B-12) 1000 MCG CAPS Take 0.5 capsules by mouth daily    Historical Provider, MD   fluocinonide (LIDEX) 0.05 % ointment Apply 2 applicators topically 2 times daily as needed (dry skin on arms and legs). Apply topically 2 times daily. Historical Provider, MD   aspirin 81 MG tablet Take 81 mg by mouth daily. Historical Provider, MD     Coumadin Use Last 7 Days:  no  Antiplatelet drug therapy use last 7 days: no  Other anticoagulant use last 7 days: no  Additional Medication Information:  na      Pre-Sedation Documentation and Exam:   I have reviewed the patient's history and review of systems.     Mallampati Airway Assessment:  Mallampati Class II - (soft palate, fauces & uvula are visible)    Prior History of Anesthesia Complications:   none    ASA Classification:  Class 2 - A normal healthy patient with mild systemic disease    Sedation/ Anesthesia Plan:   intravenous sedation    Medications Planned:   midazolam (Versed) intravenously and fentanyl intravenously    Patient is an appropriate candidate for plan of sedation: yes    Electronically signed by Dallas Davis MD on 4/5/2021 at 8:19 AM

## 2021-04-05 NOTE — PROGRESS NOTES
Patient's visitor Franklyn Lazaro verbalized understanding of discharge instructions. They have no questions at this time.

## 2021-05-24 ENCOUNTER — OFFICE VISIT (OUTPATIENT)
Dept: PULMONOLOGY | Age: 76
End: 2021-05-24
Payer: MEDICARE

## 2021-05-24 VITALS
BODY MASS INDEX: 19.11 KG/M2 | WEIGHT: 129 LBS | HEIGHT: 69 IN | SYSTOLIC BLOOD PRESSURE: 142 MMHG | TEMPERATURE: 98 F | DIASTOLIC BLOOD PRESSURE: 68 MMHG | OXYGEN SATURATION: 97 % | HEART RATE: 63 BPM | RESPIRATION RATE: 16 BRPM

## 2021-05-24 DIAGNOSIS — R93.89 ABNORMAL CT OF THE CHEST: Primary | ICD-10-CM

## 2021-05-24 DIAGNOSIS — R91.8 LUNG NODULES: ICD-10-CM

## 2021-05-24 DIAGNOSIS — J43.1 PANLOBULAR EMPHYSEMA (HCC): ICD-10-CM

## 2021-05-24 PROCEDURE — G8420 CALC BMI NORM PARAMETERS: HCPCS | Performed by: INTERNAL MEDICINE

## 2021-05-24 PROCEDURE — 4040F PNEUMOC VAC/ADMIN/RCVD: CPT | Performed by: INTERNAL MEDICINE

## 2021-05-24 PROCEDURE — 99214 OFFICE O/P EST MOD 30 MIN: CPT | Performed by: INTERNAL MEDICINE

## 2021-05-24 PROCEDURE — 3023F SPIROM DOC REV: CPT | Performed by: INTERNAL MEDICINE

## 2021-05-24 PROCEDURE — G8427 DOCREV CUR MEDS BY ELIG CLIN: HCPCS | Performed by: INTERNAL MEDICINE

## 2021-05-24 PROCEDURE — 1123F ACP DISCUSS/DSCN MKR DOCD: CPT | Performed by: INTERNAL MEDICINE

## 2021-05-24 PROCEDURE — G8926 SPIRO NO PERF OR DOC: HCPCS | Performed by: INTERNAL MEDICINE

## 2021-05-24 PROCEDURE — 1036F TOBACCO NON-USER: CPT | Performed by: INTERNAL MEDICINE

## 2021-05-24 RX ORDER — ALBUTEROL SULFATE 90 UG/1
2 AEROSOL, METERED RESPIRATORY (INHALATION) EVERY 4 HOURS PRN
Qty: 1 INHALER | Refills: 4 | Status: SHIPPED | OUTPATIENT
Start: 2021-05-24

## 2021-05-24 ASSESSMENT — COPD QUESTIONNAIRES
QUESTION5_HOMEACTIVITIES: 1
QUESTION8_ENERGYLEVEL: 1
CAT_TOTALSCORE: 8
QUESTION2_CHESTPHLEGM: 1
QUESTION7_SLEEPQUALITY: 1
QUESTION6_LEAVINGHOUSE: 1

## 2021-05-24 NOTE — PATIENT INSTRUCTIONS
Continue with inhalers    Use albuterol prior to exercise    CT Chest in one month    Follow up in 4 months

## 2021-05-24 NOTE — PROGRESS NOTES
Exercise per Week:     Minutes of Exercise per Session:    Stress:     Feeling of Stress :    Social Connections:     Frequency of Communication with Friends and Family:     Frequency of Social Gatherings with Friends and Family:     Attends Religion Services:     Active Member of Clubs or Organizations:     Attends Club or Organization Meetings:     Marital Status:    Intimate Partner Violence:     Fear of Current or Ex-Partner:     Emotionally Abused:     Physically Abused:     Sexually Abused:        Family History   Problem Relation Age of Onset    Coronary Art Dis Father     Heart Disease Father     Hypertension Mother     Cancer Mother         Leukemia    Other Mother         migraines         Current Outpatient Medications:     albuterol sulfate HFA (PROAIR HFA) 108 (90 Base) MCG/ACT inhaler, Inhale 2 puffs into the lungs every 4 hours as needed for Wheezing or Shortness of Breath, Disp: 1 Inhaler, Rfl: 4    ibuprofen (ADVIL;MOTRIN) 200 MG tablet, Take 200 mg by mouth every 6 hours as needed for Pain, Disp: , Rfl:     omeprazole (PRILOSEC) 10 MG delayed release capsule, Take 10 mg by mouth daily, Disp: , Rfl:     hydrOXYzine (ATARAX) 25 MG tablet, Take 25 mg by mouth nightly as needed for Itching, Disp: , Rfl:     lisinopril-hydrochlorothiazide (PRINZIDE;ZESTORETIC) 20-25 MG per tablet, Take 1 tablet by mouth daily, Disp: , Rfl:     acetaminophen (TYLENOL) 500 MG tablet, Take 500 mg by mouth every 6 hours as needed for Pain, Disp: , Rfl:     Cyanocobalamin (B-12) 1000 MCG CAPS, Take 0.5 capsules by mouth daily, Disp: , Rfl:     tiotropium (SPIRIVA) 18 MCG inhalation capsule, Inhale 18 mcg into the lungs daily, Disp: , Rfl:     albuterol (PROVENTIL) (2.5 MG/3ML) 0.083% nebulizer solution, Take 2.5 mg by nebulization every 6 hours as needed for Wheezing, Disp: , Rfl:     Fluticasone-Salmeterol (ADVAIR DISKUS IN), Inhale into the lungs 2 times daily, Disp: , Rfl:     fluocinonide (LIDEX) 0.05 % ointment, Apply 2 applicators topically 2 times daily as needed (dry skin on arms and legs). Apply topically 2 times daily. , Disp: , Rfl:     aspirin 81 MG tablet, Take 81 mg by mouth daily. , Disp: , Rfl:         PHYSICAL EXAM:  Vitals:    05/24/21 0914   BP: (!) 142/68   Pulse: 63   Resp: 16   Temp: 98 °F (36.7 °C)   SpO2: 97%       GENERAL:  Well nourished, alert, appears stated age, no distress, thin  HEENT:  No scleral icterus, no conjunctival irritation. Mallampati III  NECK:  No thyromegaly, no bruits  LYMPH:  No cervical or supraclavicular adenopathy  HEART:  Regular rate and rhythm, no murmurs  LUNGS:  Essentially clear   ABDOMEN:  No distention, no organomegaly  EXTREMITIES:  No edema, no digital clubbing  NEURO:  No localizing deficits, CN II-XII intact    Pulmonary Function Testing  None    Chest imaging from 3/2021 is reviewed. My interpretation is diffuse emphysema, ANDREA cavitary process with lateral/peripheral thickening and scattered nodules. RUL scarring/fibrosis. CT from 2015 showed bilateral upper lobe airspace disease from presumed infection at that time     PET 3/21  Pleuroparenchymal thickening within the lateral left upper lobe does   demonstrate metabolic activity, to a degree for which malignancy is a   differential consideration given the underlying emphysema.       1 cm right upper lobe pulmonary nodule is not significantly hypermetabolic. Additional smaller pulmonary nodules are too small to characterize by PET. Continued attention to these nodules on CT follow-up is suggested.       Diffuse muscular activity bilaterally, which can be due to high insulin   state, recent meal, or vigorous exertion preceding the exam.       CT guided lung biopsy 4/21  - Fragment of pulmonary parenchyma with chronic inflammation,   fibroelastic changes and reactive pneumocytes. - No granulomatous inflammation, specific viral cytopathic changes or   malignancy identified.  JIAJA/JIAJA     ECHO  None  Lab Results   Component Value Date    WBC 14.2 (H) 04/05/2021    HGB 13.2 (L) 04/05/2021    HCT 39.0 (L) 04/05/2021    MCV 85.6 04/05/2021     04/05/2021       No results found for: BNP    Lab Results   Component Value Date    CREATININE 1.1 05/05/2017    BUN 34 (H) 05/05/2017     (L) 05/05/2017    K 4.5 05/05/2017    CL 97 (L) 05/05/2017    CO2 24 05/05/2017       I reviewed above labs and studies pertinent to this visit and date    Assessment/Plan:  1. Abnormal CT of the chest  Cavitary portion was biopsied and negative for lung cancer. Monitor at this time. CT Chest in June (3 months from the last one)  - CT CHEST WO CONTRAST; Future    2. Panlobular emphysema ([address]  Continue with triple therapy. Will get albuterol HFA to use on prn basis, including prior to exertion etc  - albuterol sulfate HFA (PROAIR HFA) 108 (90 Base) MCG/ACT inhaler; Inhale 2 puffs into the lungs every 4 hours as needed for Wheezing or Shortness of Breath  Dispense: 1 Inhaler; Refill: 4    3. Lung nodules  Small on previous CT, CT in 3 months to evaluate  - CT CHEST WO CONTRAST;  Future      Follow up in 4 months    CT next month

## 2021-06-25 ENCOUNTER — HOSPITAL ENCOUNTER (OUTPATIENT)
Dept: CT IMAGING | Age: 76
Discharge: HOME | End: 2021-06-25
Payer: MEDICARE

## 2021-06-25 DIAGNOSIS — R93.89 ABNORMAL CT OF THE CHEST: ICD-10-CM

## 2021-06-25 DIAGNOSIS — R91.8 LUNG NODULES: ICD-10-CM

## 2021-06-25 PROCEDURE — 71250 CT THORAX DX C-: CPT

## 2021-06-28 DIAGNOSIS — R93.89 ABNORMAL CT OF THE CHEST: Primary | ICD-10-CM

## 2021-07-01 ENCOUNTER — TELEPHONE (OUTPATIENT)
Dept: CASE MANAGEMENT | Age: 76
End: 2021-07-01

## 2021-07-01 NOTE — TELEPHONE ENCOUNTER
CT Chest completed on 6/25/2021. Dr Coe Diss reviewed results with recommendations & patient has been notified. Smoking history updated.

## 2021-07-15 ENCOUNTER — CLINICAL DOCUMENTATION (OUTPATIENT)
Dept: OTHER | Age: 76
End: 2021-07-15

## 2021-09-30 ENCOUNTER — HOSPITAL ENCOUNTER (OUTPATIENT)
Dept: CT IMAGING | Age: 76
Discharge: HOME | End: 2021-09-30
Payer: MEDICARE

## 2021-09-30 DIAGNOSIS — R93.89 ABNORMAL CT OF THE CHEST: ICD-10-CM

## 2021-09-30 DIAGNOSIS — R91.8 LUNG NODULES: Primary | ICD-10-CM

## 2021-09-30 PROCEDURE — 71250 CT THORAX DX C-: CPT

## 2021-10-01 ENCOUNTER — TELEPHONE (OUTPATIENT)
Dept: PULMONOLOGY | Age: 76
End: 2021-10-01

## 2021-10-01 NOTE — TELEPHONE ENCOUNTER
Pt notified of message  R/S appt til November--daughter was in from [facility] and pt had to drive her to the airport

## 2021-10-01 NOTE — TELEPHONE ENCOUNTER
Lucio calls. He received his chest CT results yesterday. Does he still need to keep his appointment with Dr. Galina Castro on 10/12? Feels good. Please advise.

## 2021-10-14 ENCOUNTER — OFFICE VISIT (OUTPATIENT)
Dept: PULMONOLOGY | Age: 76
End: 2021-10-14
Payer: MEDICARE

## 2021-10-14 VITALS
TEMPERATURE: 97 F | DIASTOLIC BLOOD PRESSURE: 70 MMHG | RESPIRATION RATE: 16 BRPM | OXYGEN SATURATION: 97 % | HEIGHT: 69 IN | BODY MASS INDEX: 18.78 KG/M2 | SYSTOLIC BLOOD PRESSURE: 134 MMHG | WEIGHT: 126.8 LBS | HEART RATE: 68 BPM

## 2021-10-14 DIAGNOSIS — J43.1 PANLOBULAR EMPHYSEMA (HCC): Primary | ICD-10-CM

## 2021-10-14 DIAGNOSIS — R93.89 ABNORMAL CT OF THE CHEST: ICD-10-CM

## 2021-10-14 DIAGNOSIS — R91.8 LUNG NODULES: ICD-10-CM

## 2021-10-14 NOTE — PROGRESS NOTES
Chief complaint  This is a 67y.o. year old male  who comes to see me with a chief complaint of   Chief Complaint   Patient presents with    Follow-up     ct       HPI  Here with a cc of abnormal CT Chest, emphysema    Had updated CT which showed slight improvement in the chronic lung findings. He is coughing up mucus from time to time. He is on triple therapy and likes to use albuterol before activities as I suggested last time. He has no real complaints. Coughs up mucus         Current Outpatient Medications:     albuterol sulfate HFA (PROAIR HFA) 108 (90 Base) MCG/ACT inhaler, Inhale 2 puffs into the lungs every 4 hours as needed for Wheezing or Shortness of Breath, Disp: 1 Inhaler, Rfl: 4    ibuprofen (ADVIL;MOTRIN) 200 MG tablet, Take 200 mg by mouth every 6 hours as needed for Pain, Disp: , Rfl:     omeprazole (PRILOSEC) 10 MG delayed release capsule, Take 10 mg by mouth daily, Disp: , Rfl:     hydrOXYzine (ATARAX) 25 MG tablet, Take 25 mg by mouth nightly as needed for Itching, Disp: , Rfl:     lisinopril-hydrochlorothiazide (PRINZIDE;ZESTORETIC) 20-25 MG per tablet, Take 1 tablet by mouth daily, Disp: , Rfl:     Cyanocobalamin (B-12) 1000 MCG CAPS, Take 0.5 capsules by mouth daily, Disp: , Rfl:     tiotropium (SPIRIVA) 18 MCG inhalation capsule, Inhale 18 mcg into the lungs daily, Disp: , Rfl:     albuterol (PROVENTIL) (2.5 MG/3ML) 0.083% nebulizer solution, Take 2.5 mg by nebulization every 6 hours as needed for Wheezing, Disp: , Rfl:     Fluticasone-Salmeterol (ADVAIR DISKUS IN), Inhale into the lungs 2 times daily, Disp: , Rfl:     aspirin 81 MG tablet, Take 81 mg by mouth daily. , Disp: , Rfl:     acetaminophen (TYLENOL) 500 MG tablet, Take 500 mg by mouth every 6 hours as needed for Pain, Disp: , Rfl:     fluocinonide (LIDEX) 0.05 % ointment, Apply 2 applicators topically 2 times daily as needed (dry skin on arms and legs). Apply topically 2 times daily. , Disp: , Rfl: PHYSICAL EXAM:  Vitals:    10/14/21 1046   BP: 134/70   Pulse: 68   Resp: 16   Temp: 97 °F (36.1 °C)   SpO2: 97%       GENERAL:  Well nourished, alert, appears stated age, no distress, thin  HEENT:  No scleral icterus, no conjunctival irritation. Mallampati III  NECK:  No thyromegaly, no bruits  LYMPH:  No cervical or supraclavicular adenopathy  HEART:  Regular rate and rhythm, no murmurs  LUNGS:  Essentially clear   ABDOMEN:  No distention, no organomegaly  EXTREMITIES:  No edema, no digital clubbing  NEURO:  No localizing deficits, CN II-XII intact    Pulmonary Function Testing  None    Chest imaging from 9/2021 is reviewed and compared to 6/21 . My interpretation is diffuse emphysema, ANDREA cavitary process with lateral/peripheral thickening and scattered nodules which might be smaller, smaller scattered nodules, stable nodules, scarring in the RUL with decrease in density    PET 3/21  Pleuroparenchymal thickening within the lateral left upper lobe does   demonstrate metabolic activity, to a degree for which malignancy is a   differential consideration given the underlying emphysema.       1 cm right upper lobe pulmonary nodule is not significantly hypermetabolic. Additional smaller pulmonary nodules are too small to characterize by PET. Continued attention to these nodules on CT follow-up is suggested.       Diffuse muscular activity bilaterally, which can be due to high insulin   state, recent meal, or vigorous exertion preceding the exam.       CT guided lung biopsy 4/21  - Fragment of pulmonary parenchyma with chronic inflammation,   fibroelastic changes and reactive pneumocytes. - No granulomatous inflammation, specific viral cytopathic changes or   malignancy identified.    CHLOE/CHLOE     I reviewed above labs and studies pertinent to this visit and date    Assessment/Plan:  1. Panlobular emphysema (HCC)  Controlled breathing with triple inhalation and prn albuterol.   I encourage albuterol use piror to activities     2. Abnormal CT of the chest  Slight improvement in the chronic cavitary like opacities. Biopsy negative in the past.  CT in 6 months to monitor   - CT CHEST WO CONTRAST; Future    3.  Lung nodules  Small, stable and some are improving     Follow up in 6 months    CT in 6 months    Flu and COVID vaccine up to date

## 2021-11-11 ENCOUNTER — TELEPHONE (OUTPATIENT)
Dept: ORTHOPEDIC SURGERY | Age: 76
End: 2021-11-11

## 2021-11-11 NOTE — TELEPHONE ENCOUNTER
Spoke with patient to let him know about the [address]  joint pain program. Patient isn't experiencing any knee issues at this time. He can give us a call at [phone] if there is anything further that we can do for him.

## 2022-04-26 ENCOUNTER — OFFICE VISIT (OUTPATIENT)
Dept: PULMONOLOGY | Age: 77
End: 2022-04-26
Payer: MEDICARE

## 2022-04-26 VITALS
HEART RATE: 70 BPM | WEIGHT: 124.8 LBS | TEMPERATURE: 96.9 F | OXYGEN SATURATION: 97 % | BODY MASS INDEX: 18.48 KG/M2 | RESPIRATION RATE: 21 BRPM | HEIGHT: 69 IN | SYSTOLIC BLOOD PRESSURE: 115 MMHG | DIASTOLIC BLOOD PRESSURE: 70 MMHG

## 2022-04-26 DIAGNOSIS — J43.1 PANLOBULAR EMPHYSEMA (HCC): Primary | ICD-10-CM

## 2022-04-26 DIAGNOSIS — R91.8 LUNG NODULES: ICD-10-CM

## 2022-04-26 DIAGNOSIS — R93.89 ABNORMAL CT OF THE CHEST: ICD-10-CM

## 2022-04-26 PROCEDURE — G8419 CALC BMI OUT NRM PARAM NOF/U: HCPCS | Performed by: INTERNAL MEDICINE

## 2022-04-26 PROCEDURE — 1036F TOBACCO NON-USER: CPT | Performed by: INTERNAL MEDICINE

## 2022-04-26 PROCEDURE — 1123F ACP DISCUSS/DSCN MKR DOCD: CPT | Performed by: INTERNAL MEDICINE

## 2022-04-26 PROCEDURE — 4040F PNEUMOC VAC/ADMIN/RCVD: CPT | Performed by: INTERNAL MEDICINE

## 2022-04-26 PROCEDURE — 99214 OFFICE O/P EST MOD 30 MIN: CPT | Performed by: INTERNAL MEDICINE

## 2022-04-26 PROCEDURE — G8427 DOCREV CUR MEDS BY ELIG CLIN: HCPCS | Performed by: INTERNAL MEDICINE

## 2022-04-26 PROCEDURE — 3023F SPIROM DOC REV: CPT | Performed by: INTERNAL MEDICINE

## 2022-04-26 NOTE — PROGRESS NOTES
Chief complaint  This is a 67y.o. year old male  who comes to see me with a chief complaint of   Chief Complaint   Patient presents with    Follow-up     abnormal CT       HPI  Here with a cc of abnormal CT Chest, emphysema    He never did a follow up CT from last visit as he never received his paperwork. He is doing very well. On triple inhalers and prn albuterol. Uses the albuterol proactively which does help. No recent illnesses        Current Outpatient Medications:     albuterol sulfate HFA (PROAIR HFA) 108 (90 Base) MCG/ACT inhaler, Inhale 2 puffs into the lungs every 4 hours as needed for Wheezing or Shortness of Breath, Disp: 1 Inhaler, Rfl: 4    ibuprofen (ADVIL;MOTRIN) 200 MG tablet, Take 200 mg by mouth every 6 hours as needed for Pain, Disp: , Rfl:     omeprazole (PRILOSEC) 10 MG delayed release capsule, Take 10 mg by mouth daily, Disp: , Rfl:     hydrOXYzine (ATARAX) 25 MG tablet, Take 25 mg by mouth nightly as needed for Itching, Disp: , Rfl:     lisinopril-hydrochlorothiazide (PRINZIDE;ZESTORETIC) 20-25 MG per tablet, Take 1 tablet by mouth daily, Disp: , Rfl:     acetaminophen (TYLENOL) 500 MG tablet, Take 500 mg by mouth every 6 hours as needed for Pain, Disp: , Rfl:     Cyanocobalamin (B-12) 1000 MCG CAPS, Take 0.5 capsules by mouth daily, Disp: , Rfl:     tiotropium (SPIRIVA) 18 MCG inhalation capsule, Inhale 18 mcg into the lungs daily, Disp: , Rfl:     albuterol (PROVENTIL) (2.5 MG/3ML) 0.083% nebulizer solution, Take 2.5 mg by nebulization every 6 hours as needed for Wheezing, Disp: , Rfl:     Fluticasone-Salmeterol (ADVAIR DISKUS IN), Inhale into the lungs 2 times daily, Disp: , Rfl:     fluocinonide (LIDEX) 0.05 % ointment, Apply 2 applicators topically 2 times daily as needed (dry skin on arms and legs). Apply topically 2 times daily. , Disp: , Rfl:     aspirin 81 MG tablet, Take 81 mg by mouth daily. , Disp: , Rfl:         PHYSICAL EXAM:  Vitals:    04/26/22 0821   BP: 115/70   Pulse: 70   Resp: 21   Temp: 96.9 °F (36.1 °C)   SpO2: 97%       GENERAL:  Well nourished, alert, appears stated age, no distress, thin  HEENT:  No scleral icterus, no conjunctival irritation. Mallampati III  NECK:  No thyromegaly, no bruits  LYMPH:  No cervical or supraclavicular adenopathy  HEART:  Regular rate and rhythm, no murmurs  LUNGS:  Clear with slightly diminished BS  ABDOMEN:  No distention, no organomegaly  EXTREMITIES:  No edema, no digital clubbing  NEURO:  No localizing deficits, CN II-XII intact    Pulmonary Function Testing  None    Chest imaging from 9/2021 is reviewed and compared to 6/21 . My interpretation is diffuse emphysema, ANDREA cavitary process with lateral/peripheral thickening and scattered nodules which might be smaller, smaller scattered nodules, stable nodules, scarring in the RUL with decrease in density    PET 3/21  Pleuroparenchymal thickening within the lateral left upper lobe does   demonstrate metabolic activity, to a degree for which malignancy is a   differential consideration given the underlying emphysema.       1 cm right upper lobe pulmonary nodule is not significantly hypermetabolic. Additional smaller pulmonary nodules are too small to characterize by PET. Continued attention to these nodules on CT follow-up is suggested.       Diffuse muscular activity bilaterally, which can be due to high insulin   state, recent meal, or vigorous exertion preceding the exam.       CT guided lung biopsy 4/21  - Fragment of pulmonary parenchyma with chronic inflammation,   fibroelastic changes and reactive pneumocytes. - No granulomatous inflammation, specific viral cytopathic changes or   malignancy identified.    CHLOE/CHLOE     I reviewed above labs and studies pertinent to this visit and date    Assessment/Plan:  1. Panlobular emphysema ([address]  Never had PFT. Really responds to inhalers so in lieu of getting PFTs will continue with inhalers as is.      2. Abnormal CT of the chest  Improving/regressing cavitary process in the ANDREA with previous negative biopsy. CT Chest to monitor. IF continues to improve, will just need yearly CT thereafter  - CT CHEST WO CONTRAST; Future    3. Lung nodules  Small and regressing. Suspect post-inflammatory in nature  - CT CHEST WO CONTRAST;  Future      Follow up in 6 months    CT due now     Flu and COVID vaccine up to date

## 2022-05-02 ENCOUNTER — HOSPITAL ENCOUNTER (OUTPATIENT)
Dept: CT IMAGING | Age: 77
Discharge: HOME | End: 2022-05-02
Payer: MEDICARE

## 2022-05-02 DIAGNOSIS — R91.8 LUNG NODULES: ICD-10-CM

## 2022-05-02 DIAGNOSIS — R93.89 ABNORMAL CT OF THE CHEST: ICD-10-CM

## 2022-05-02 PROCEDURE — 71250 CT THORAX DX C-: CPT

## 2022-05-05 ENCOUNTER — TELEPHONE (OUTPATIENT)
Dept: PULMONOLOGY | Age: 77
End: 2022-05-05

## 2022-05-05 NOTE — TELEPHONE ENCOUNTER
Sorry I missed getting him the results. But CT is stable to slightly improved from before. For some reason the CT was filed before I could review it.   Next CT in 1 year

## 2022-05-05 NOTE — TELEPHONE ENCOUNTER
Yudith calls  In with Valente Michael on speaker phone requesting results from CT done on 5/2/2022. Can be reached at home #  [phone].

## 2023-04-20 ENCOUNTER — TELEPHONE (OUTPATIENT)
Dept: PULMONOLOGY | Age: 78
End: 2023-04-20

## 2023-04-20 NOTE — TELEPHONE ENCOUNTER
Naveen Duran wants to know if he needs to have another CT of his lungs before he comes in for his next appointment. Please review. Thank you!

## 2023-04-21 NOTE — TELEPHONE ENCOUNTER
I called [place] to confirm his appt Monday. Should he not come in if he hasn't had another CT since his last appt? Please call him and let him know. Thank you.

## 2023-04-24 ENCOUNTER — OFFICE VISIT (OUTPATIENT)
Dept: PULMONOLOGY | Age: 78
End: 2023-04-24
Payer: MEDICARE

## 2023-04-24 VITALS
OXYGEN SATURATION: 95 % | TEMPERATURE: 97.3 F | WEIGHT: 127.6 LBS | SYSTOLIC BLOOD PRESSURE: 110 MMHG | HEART RATE: 107 BPM | BODY MASS INDEX: 18.9 KG/M2 | RESPIRATION RATE: 16 BRPM | DIASTOLIC BLOOD PRESSURE: 50 MMHG | HEIGHT: 69 IN

## 2023-04-24 DIAGNOSIS — J43.1 PANLOBULAR EMPHYSEMA (HCC): Primary | ICD-10-CM

## 2023-04-24 DIAGNOSIS — R93.89 ABNORMAL CT OF THE CHEST: ICD-10-CM

## 2023-04-24 DIAGNOSIS — R91.8 LUNG NODULES: ICD-10-CM

## 2023-04-24 PROCEDURE — 3078F DIAST BP <80 MM HG: CPT | Performed by: INTERNAL MEDICINE

## 2023-04-24 PROCEDURE — 3074F SYST BP LT 130 MM HG: CPT | Performed by: INTERNAL MEDICINE

## 2023-04-24 PROCEDURE — 99214 OFFICE O/P EST MOD 30 MIN: CPT | Performed by: INTERNAL MEDICINE

## 2023-04-24 PROCEDURE — 1123F ACP DISCUSS/DSCN MKR DOCD: CPT | Performed by: INTERNAL MEDICINE

## 2023-04-24 RX ORDER — ALBUTEROL SULFATE 90 UG/1
2 AEROSOL, METERED RESPIRATORY (INHALATION) EVERY 4 HOURS PRN
Qty: 18 G | Refills: 3 | Status: SHIPPED | OUTPATIENT
Start: 2023-04-24

## 2023-04-24 NOTE — PROGRESS NOTES
Chief complaint  This is a 65y.o. year old male  who comes to see me with a chief complaint of   Chief Complaint   Patient presents with    COPD       HPI  Here with a cc of abnormal CT Chest, emphysema    He is doing well. On triple inhalers and prn albuterol. He has a generic albuterol inhaler which is very hard to read how many puffs are left. He is doing well. He does not notice any improvement with albuterol prior to exertional activities. He mainly gets SOB when carrying things.   Coughs up mucus frequently       Current Outpatient Medications:     albuterol sulfate HFA (PROAIR HFA) 108 (90 Base) MCG/ACT inhaler, Inhale 2 puffs into the lungs every 4 hours as needed for Wheezing or Shortness of Breath, Disp: 18 g, Rfl: 3    albuterol sulfate HFA (PROAIR HFA) 108 (90 Base) MCG/ACT inhaler, Inhale 2 puffs into the lungs every 4 hours as needed for Wheezing or Shortness of Breath, Disp: 1 Inhaler, Rfl: 4    ibuprofen (ADVIL;MOTRIN) 200 MG tablet, Take 1 tablet by mouth every 6 hours as needed for Pain, Disp: , Rfl:     omeprazole (PRILOSEC) 10 MG delayed release capsule, Take 1 capsule by mouth daily, Disp: , Rfl:     hydrOXYzine (ATARAX) 25 MG tablet, Take 1 tablet by mouth nightly as needed for Itching, Disp: , Rfl:     lisinopril-hydrochlorothiazide (PRINZIDE;ZESTORETIC) 20-25 MG per tablet, Take 1 tablet by mouth daily, Disp: , Rfl:     acetaminophen (TYLENOL) 500 MG tablet, Take 1 tablet by mouth every 6 hours as needed for Pain, Disp: , Rfl:     Cyanocobalamin (B-12) 1000 MCG CAPS, Take 0.5 capsules by mouth daily, Disp: , Rfl:     tiotropium (SPIRIVA) 18 MCG inhalation capsule, Inhale 1 capsule into the lungs daily, Disp: , Rfl:     albuterol (PROVENTIL) (2.5 MG/3ML) 0.083% nebulizer solution, Take 3 mLs by nebulization every 6 hours as needed for Wheezing, Disp: , Rfl:     Fluticasone-Salmeterol (ADVAIR DISKUS IN), Inhale into the lungs 2 times daily, Disp: , Rfl:     fluocinonide (LIDEX) 0.05 %

## 2023-05-16 NOTE — ED NOTES
Bed: B-09  Expected date:   Expected time:   Means of arrival: [facility]  Comments:  78M knee pain, fever     Jeremie Angelo RN  03/29/21 3065
0 = understands/communicates without difficulty

## 2023-05-24 ENCOUNTER — HOSPITAL ENCOUNTER (OUTPATIENT)
Dept: CT IMAGING | Age: 78
Discharge: HOME | End: 2023-05-24
Payer: MEDICARE

## 2023-05-24 DIAGNOSIS — R93.89 ABNORMAL CT OF THE CHEST: ICD-10-CM

## 2023-05-24 DIAGNOSIS — R91.8 LUNG NODULES: ICD-10-CM

## 2023-05-24 PROCEDURE — 71250 CT THORAX DX C-: CPT

## 2023-06-01 ENCOUNTER — TELEPHONE (OUTPATIENT)
Dept: PULMONOLOGY | Age: 78
End: 2023-06-01

## 2023-06-07 ENCOUNTER — TELEPHONE (OUTPATIENT)
Dept: PULMONOLOGY | Age: 78
End: 2023-06-07

## 2023-06-07 DIAGNOSIS — R91.8 LUNG NODULES: Primary | ICD-10-CM

## 2023-06-27 ENCOUNTER — HOSPITAL ENCOUNTER (OUTPATIENT)
Dept: PET IMAGING | Age: 78
Discharge: HOME | End: 2023-06-27
Attending: INTERNAL MEDICINE
Payer: MEDICARE

## 2023-06-27 DIAGNOSIS — R91.8 LUNG NODULES: ICD-10-CM

## 2023-06-27 PROCEDURE — A9552 F18 FDG: HCPCS | Performed by: INTERNAL MEDICINE

## 2023-06-27 PROCEDURE — 3430000000 HC RX DIAGNOSTIC RADIOPHARMACEUTICAL: Performed by: INTERNAL MEDICINE

## 2023-06-27 PROCEDURE — 78815 PET IMAGE W/CT SKULL-THIGH: CPT

## 2023-06-27 RX ORDER — FLUDEOXYGLUCOSE F 18 200 MCI/ML
14.71 INJECTION, SOLUTION INTRAVENOUS
Status: COMPLETED | OUTPATIENT
Start: 2023-06-27 | End: 2023-06-27

## 2023-06-27 RX ADMIN — FLUDEOXYGLUCOSE F 18 14.71 MILLICURIE: 200 INJECTION, SOLUTION INTRAVENOUS at 10:11

## 2023-07-06 DIAGNOSIS — J84.89 ORGANIZING PNEUMONIA (HCC): Primary | ICD-10-CM

## 2023-07-06 RX ORDER — PREDNISONE 10 MG/1
TABLET ORAL
Qty: 30 TABLET | Refills: 0 | Status: SHIPPED | OUTPATIENT
Start: 2023-07-06

## 2023-07-06 RX ORDER — CLARITHROMYCIN 500 MG/1
500 TABLET, COATED ORAL 2 TIMES DAILY
Qty: 28 TABLET | Refills: 0 | Status: SHIPPED | OUTPATIENT
Start: 2023-07-06 | End: 2023-07-20

## 2023-12-29 ENCOUNTER — TELEPHONE (OUTPATIENT)
Dept: PULMONOLOGY | Age: 78
End: 2023-12-29

## 2023-12-29 RX ORDER — PREDNISONE 10 MG/1
TABLET ORAL
Qty: 30 TABLET | Refills: 0 | Status: SHIPPED | OUTPATIENT
Start: 2023-12-29

## 2023-12-29 NOTE — TELEPHONE ENCOUNTER
Jacqueline Doshi called and stated past few days he has been SOB with any activity and congestion. Requesting something to be called into pharmacy. Marielos Costa has history of Emphysema.

## 2024-01-04 ENCOUNTER — TELEPHONE (OUTPATIENT)
Dept: PULMONOLOGY | Age: 79
End: 2024-01-04

## 2024-01-04 ENCOUNTER — OFFICE VISIT (OUTPATIENT)
Dept: PULMONOLOGY | Age: 79
End: 2024-01-04
Payer: MEDICARE

## 2024-01-04 VITALS
WEIGHT: 119.6 LBS | HEIGHT: 69 IN | HEART RATE: 100 BPM | DIASTOLIC BLOOD PRESSURE: 90 MMHG | RESPIRATION RATE: 18 BRPM | TEMPERATURE: 97.2 F | SYSTOLIC BLOOD PRESSURE: 125 MMHG | BODY MASS INDEX: 17.71 KG/M2 | OXYGEN SATURATION: 89 %

## 2024-01-04 DIAGNOSIS — J43.1 PANLOBULAR EMPHYSEMA (HCC): Primary | ICD-10-CM

## 2024-01-04 DIAGNOSIS — R93.89 ABNORMAL CT OF THE CHEST: ICD-10-CM

## 2024-01-04 DIAGNOSIS — R91.8 LUNG NODULES: ICD-10-CM

## 2024-01-04 PROCEDURE — 3074F SYST BP LT 130 MM HG: CPT | Performed by: INTERNAL MEDICINE

## 2024-01-04 PROCEDURE — 99214 OFFICE O/P EST MOD 30 MIN: CPT | Performed by: INTERNAL MEDICINE

## 2024-01-04 PROCEDURE — 3080F DIAST BP >= 90 MM HG: CPT | Performed by: INTERNAL MEDICINE

## 2024-01-04 PROCEDURE — 1123F ACP DISCUSS/DSCN MKR DOCD: CPT | Performed by: INTERNAL MEDICINE

## 2024-01-04 NOTE — TELEPHONE ENCOUNTER
Pharmacy faxed over with note stating ProAir Brand is no longer available. The alternatives are Ventolin, Provental and Albuterol is available. Please advise. Thanks.

## 2024-01-04 NOTE — PROGRESS NOTES
every 6 hours as needed for Pain, Disp: , Rfl:     Cyanocobalamin (B-12) 1000 MCG CAPS, Take 0.5 capsules by mouth daily, Disp: , Rfl:     tiotropium (SPIRIVA) 18 MCG inhalation capsule, Inhale 1 capsule into the lungs daily, Disp: , Rfl:     albuterol (PROVENTIL) (2.5 MG/3ML) 0.083% nebulizer solution, Take 3 mLs by nebulization every 6 hours as needed for Wheezing, Disp: , Rfl:     Fluticasone-Salmeterol (ADVAIR DISKUS IN), Inhale into the lungs 2 times daily, Disp: , Rfl:     fluocinonide (LIDEX) 0.05 % ointment, Apply 2 applicators topically 2 times daily as needed (dry skin on arms and legs). Apply topically 2 times daily., Disp: , Rfl:     aspirin 81 MG tablet, Take 1 tablet by mouth daily, Disp: , Rfl:         PHYSICAL EXAM:  Vitals:    01/04/24 0826   BP: (!) 125/90   Pulse: 100   Resp: 18   Temp: 97.2 °F (36.2 °C)   SpO2: (!) 89%       GENERAL:  Well nourished, alert, appears stated age, no distress, thin  HEENT:  No scleral icterus, no conjunctival irritation.  Mallampati III  NECK:  No thyromegaly, no bruits  LYMPH:  No cervical or supraclavicular adenopathy  HEART:  Regular rate and rhythm, no murmurs  LUNGS:  Slight rhonchi, clear otherwise  ABDOMEN:  No distention, no organomegaly  EXTREMITIES:  No edema, no digital clubbing  NEURO:  No localizing deficits, CN II-XII intact    Pulmonary Function Testing  None    Chest imaging from 5/23 is reviewed and compared to previous CT scans .  My interpretation is diffuse emphysema, stable ANDREA cavitary process with scarring new mid lung to lower long opacities/patchy infiltrates     PET 6/23  1. Waxing/waning pattern dense nodular airspace opacification in the lungs  over the course of multiple prior imaging studies.  A chronic infectious or  inflammatory process including cryptogenic organizing pneumonia may be  considered.  Rapid development of the dominant finding in the right upper  lobe is not consistent with neoplasm.  2. Advanced centrilobular emphysema.

## 2024-01-04 NOTE — PATIENT INSTRUCTIONS
Continue with inhalers.  Use albuterol before going out in the cold     May have to change pharmacies to get Pro Air    Ct chest now    Follow up in 6 months

## 2024-01-05 NOTE — TELEPHONE ENCOUNTER
He was contacted this morning and he stated that the Albuterol no longer helps. Pt would like for Dr. Salinas to send in either the Ventolin or Provental, whichever is the better alternative for him.

## 2024-01-06 ENCOUNTER — APPOINTMENT (OUTPATIENT)
Dept: GENERAL RADIOLOGY | Age: 79
DRG: 191 | End: 2024-01-06
Payer: MEDICARE

## 2024-01-06 ENCOUNTER — HOSPITAL ENCOUNTER (INPATIENT)
Age: 79
LOS: 1 days | Discharge: HOME | DRG: 191 | End: 2024-01-09
Attending: EMERGENCY MEDICINE | Admitting: HOSPITALIST
Payer: MEDICARE

## 2024-01-06 DIAGNOSIS — R79.89 ELEVATED TROPONIN: ICD-10-CM

## 2024-01-06 DIAGNOSIS — J44.1 COPD EXACERBATION (HCC): Primary | ICD-10-CM

## 2024-01-06 LAB
ALBUMIN SERPL-MCNC: 3.5 G/DL (ref 3.4–5)
ALBUMIN/GLOB SERPL: 1.3 {RATIO} (ref 1.1–2.2)
ALP SERPL-CCNC: 87 U/L (ref 40–129)
ALT SERPL-CCNC: 8 U/L (ref 10–40)
ANION GAP SERPL CALCULATED.3IONS-SCNC: 12 MMOL/L (ref 3–16)
APTT BLD: 26.4 SEC (ref 22.7–35.9)
AST SERPL-CCNC: 12 U/L (ref 15–37)
BASE EXCESS BLDV CALC-SCNC: 3.4 MMOL/L
BASOPHILS # BLD: 0.1 K/UL (ref 0–0.2)
BASOPHILS NFR BLD: 0.6 %
BILIRUB SERPL-MCNC: 0.3 MG/DL (ref 0–1)
BILIRUB UR QL STRIP.AUTO: NEGATIVE
BUN SERPL-MCNC: 21 MG/DL (ref 7–20)
CALCIUM SERPL-MCNC: 8.6 MG/DL (ref 8.3–10.6)
CHLORIDE SERPL-SCNC: 104 MMOL/L (ref 99–110)
CLARITY UR: CLEAR
CO2 BLDV-SCNC: 31 MMOL/L
CO2 SERPL-SCNC: 24 MMOL/L (ref 21–32)
COHGB MFR BLDV: 1.3 %
COLOR UR: YELLOW
CREAT SERPL-MCNC: 0.8 MG/DL (ref 0.8–1.3)
D DIMER: 0.68 UG/ML FEU (ref 0–0.6)
DEPRECATED RDW RBC AUTO: 19.4 % (ref 12.4–15.4)
EOSINOPHIL # BLD: 0.1 K/UL (ref 0–0.6)
EOSINOPHIL NFR BLD: 0.4 %
FLUAV RNA UPPER RESP QL NAA+PROBE: NEGATIVE
FLUBV AG NPH QL: NEGATIVE
GFR SERPLBLD CREATININE-BSD FMLA CKD-EPI: >60 ML/MIN/{1.73_M2}
GLUCOSE SERPL-MCNC: 82 MG/DL (ref 70–99)
GLUCOSE UR STRIP.AUTO-MCNC: NEGATIVE MG/DL
HCO3 BLDV-SCNC: 29 MMOL/L (ref 23–29)
HCT VFR BLD AUTO: 32.8 % (ref 40.5–52.5)
HGB BLD-MCNC: 11 G/DL (ref 13.5–17.5)
HGB UR QL STRIP.AUTO: NEGATIVE
INR PPP: 0.99 (ref 0.84–1.16)
KETONES UR STRIP.AUTO-MCNC: NEGATIVE MG/DL
LACTATE BLDV-SCNC: 1.8 MMOL/L (ref 0.4–1.9)
LACTATE BLDV-SCNC: 2.3 MMOL/L (ref 0.4–1.9)
LEUKOCYTE ESTERASE UR QL STRIP.AUTO: NEGATIVE
LYMPHOCYTES # BLD: 2.4 K/UL (ref 1–5.1)
LYMPHOCYTES NFR BLD: 12.5 %
MCH RBC QN AUTO: 28.4 PG (ref 26–34)
MCHC RBC AUTO-ENTMCNC: 33.4 G/DL (ref 31–36)
MCV RBC AUTO: 84.9 FL (ref 80–100)
METHGB MFR BLDV: 0.6 %
MONOCYTES # BLD: 1 K/UL (ref 0–1.3)
MONOCYTES NFR BLD: 5.1 %
NEUTROPHILS # BLD: 15.6 K/UL (ref 1.7–7.7)
NEUTROPHILS NFR BLD: 81.4 %
NITRITE UR QL STRIP.AUTO: NEGATIVE
NT-PROBNP SERPL-MCNC: 711 PG/ML (ref 0–449)
O2 THERAPY: NORMAL
PCO2 BLDV: 48.5 MMHG (ref 40–50)
PH BLDV: 7.39 [PH] (ref 7.35–7.45)
PH UR STRIP.AUTO: 6.5 [PH] (ref 5–8)
PLATELET # BLD AUTO: 357 K/UL (ref 135–450)
PMV BLD AUTO: 5.8 FL (ref 5–10.5)
PO2 BLDV: <30 MMHG
POTASSIUM SERPL-SCNC: 4 MMOL/L (ref 3.5–5.1)
PROCALCITONIN SERPL IA-MCNC: 0.07 NG/ML (ref 0–0.15)
PROT SERPL-MCNC: 6.1 G/DL (ref 6.4–8.2)
PROT UR STRIP.AUTO-MCNC: NEGATIVE MG/DL
PROTHROMBIN TIME: 13.1 SEC (ref 11.5–14.8)
RBC # BLD AUTO: 3.86 M/UL (ref 4.2–5.9)
SAO2 % BLDV: 40 %
SARS-COV-2 RDRP RESP QL NAA+PROBE: NOT DETECTED
SODIUM SERPL-SCNC: 140 MMOL/L (ref 136–145)
SP GR UR STRIP.AUTO: 1.02 (ref 1–1.03)
TROPONIN, HIGH SENSITIVITY: 23 NG/L (ref 0–22)
TROPONIN, HIGH SENSITIVITY: 28 NG/L (ref 0–22)
UA COMPLETE W REFLEX CULTURE PNL UR: NORMAL
UA DIPSTICK W REFLEX MICRO PNL UR: NORMAL
URN SPEC COLLECT METH UR: NORMAL
UROBILINOGEN UR STRIP-ACNC: 0.2 E.U./DL
WBC # BLD AUTO: 19.1 K/UL (ref 4–11)

## 2024-01-06 PROCEDURE — 80053 COMPREHEN METABOLIC PANEL: CPT

## 2024-01-06 PROCEDURE — 81003 URINALYSIS AUTO W/O SCOPE: CPT

## 2024-01-06 PROCEDURE — 83605 ASSAY OF LACTIC ACID: CPT

## 2024-01-06 PROCEDURE — G0378 HOSPITAL OBSERVATION PER HR: HCPCS

## 2024-01-06 PROCEDURE — 87635 SARS-COV-2 COVID-19 AMP PRB: CPT

## 2024-01-06 PROCEDURE — 6360000002 HC RX W HCPCS: Performed by: EMERGENCY MEDICINE

## 2024-01-06 PROCEDURE — 6370000000 HC RX 637 (ALT 250 FOR IP): Performed by: STUDENT IN AN ORGANIZED HEALTH CARE EDUCATION/TRAINING PROGRAM

## 2024-01-06 PROCEDURE — 71045 X-RAY EXAM CHEST 1 VIEW: CPT

## 2024-01-06 PROCEDURE — 84484 ASSAY OF TROPONIN QUANT: CPT

## 2024-01-06 PROCEDURE — 96372 THER/PROPH/DIAG INJ SC/IM: CPT

## 2024-01-06 PROCEDURE — 6360000002 HC RX W HCPCS: Performed by: STUDENT IN AN ORGANIZED HEALTH CARE EDUCATION/TRAINING PROGRAM

## 2024-01-06 PROCEDURE — 85610 PROTHROMBIN TIME: CPT

## 2024-01-06 PROCEDURE — 84145 PROCALCITONIN (PCT): CPT

## 2024-01-06 PROCEDURE — 96375 TX/PRO/DX INJ NEW DRUG ADDON: CPT

## 2024-01-06 PROCEDURE — 94760 N-INVAS EAR/PLS OXIMETRY 1: CPT

## 2024-01-06 PROCEDURE — 96365 THER/PROPH/DIAG IV INF INIT: CPT

## 2024-01-06 PROCEDURE — 96376 TX/PRO/DX INJ SAME DRUG ADON: CPT

## 2024-01-06 PROCEDURE — 85730 THROMBOPLASTIN TIME PARTIAL: CPT

## 2024-01-06 PROCEDURE — 85025 COMPLETE CBC W/AUTO DIFF WBC: CPT

## 2024-01-06 PROCEDURE — 6360000002 HC RX W HCPCS: Performed by: INTERNAL MEDICINE

## 2024-01-06 PROCEDURE — 87040 BLOOD CULTURE FOR BACTERIA: CPT

## 2024-01-06 PROCEDURE — 2580000003 HC RX 258: Performed by: EMERGENCY MEDICINE

## 2024-01-06 PROCEDURE — 96374 THER/PROPH/DIAG INJ IV PUSH: CPT

## 2024-01-06 PROCEDURE — 94640 AIRWAY INHALATION TREATMENT: CPT

## 2024-01-06 PROCEDURE — 99223 1ST HOSP IP/OBS HIGH 75: CPT | Performed by: INTERNAL MEDICINE

## 2024-01-06 PROCEDURE — 99285 EMERGENCY DEPT VISIT HI MDM: CPT

## 2024-01-06 PROCEDURE — 85379 FIBRIN DEGRADATION QUANT: CPT

## 2024-01-06 PROCEDURE — 2580000003 HC RX 258: Performed by: STUDENT IN AN ORGANIZED HEALTH CARE EDUCATION/TRAINING PROGRAM

## 2024-01-06 PROCEDURE — 83880 ASSAY OF NATRIURETIC PEPTIDE: CPT

## 2024-01-06 PROCEDURE — 87804 INFLUENZA ASSAY W/OPTIC: CPT

## 2024-01-06 PROCEDURE — 93005 ELECTROCARDIOGRAM TRACING: CPT | Performed by: EMERGENCY MEDICINE

## 2024-01-06 PROCEDURE — 6370000000 HC RX 637 (ALT 250 FOR IP): Performed by: EMERGENCY MEDICINE

## 2024-01-06 PROCEDURE — 82803 BLOOD GASES ANY COMBINATION: CPT

## 2024-01-06 RX ORDER — LISINOPRIL AND HYDROCHLOROTHIAZIDE 12.5; 2 MG/1; MG/1
0.5 TABLET ORAL DAILY
Status: ON HOLD | COMMUNITY
End: 2025-02-21 | Stop reason: HOSPADM

## 2024-01-06 RX ORDER — MULTIVITAMIN WITH IRON
500 TABLET ORAL DAILY
Status: DISCONTINUED | OUTPATIENT
Start: 2024-01-06 | End: 2024-01-09 | Stop reason: HOSPADM

## 2024-01-06 RX ORDER — SODIUM CHLORIDE 9 MG/ML
INJECTION, SOLUTION INTRAVENOUS PRN
Status: DISCONTINUED | OUTPATIENT
Start: 2024-01-06 | End: 2024-01-09 | Stop reason: HOSPADM

## 2024-01-06 RX ORDER — IPRATROPIUM BROMIDE AND ALBUTEROL SULFATE 2.5; .5 MG/3ML; MG/3ML
2 SOLUTION RESPIRATORY (INHALATION) ONCE
Status: COMPLETED | OUTPATIENT
Start: 2024-01-06 | End: 2024-01-06

## 2024-01-06 RX ORDER — LISINOPRIL AND HYDROCHLOROTHIAZIDE 20; 25 MG/1; MG/1
1 TABLET ORAL DAILY
Status: DISCONTINUED | OUTPATIENT
Start: 2024-01-06 | End: 2024-01-06

## 2024-01-06 RX ORDER — SODIUM CHLORIDE, SODIUM LACTATE, POTASSIUM CHLORIDE, CALCIUM CHLORIDE 600; 310; 30; 20 MG/100ML; MG/100ML; MG/100ML; MG/100ML
INJECTION, SOLUTION INTRAVENOUS CONTINUOUS
Status: ACTIVE | OUTPATIENT
Start: 2024-01-06 | End: 2024-01-06

## 2024-01-06 RX ORDER — FLUTICASONE PROPIONATE AND SALMETEROL 100; 50 UG/1; UG/1
1 POWDER RESPIRATORY (INHALATION) 2 TIMES DAILY
Status: DISCONTINUED | OUTPATIENT
Start: 2024-01-06 | End: 2024-01-06 | Stop reason: CLARIF

## 2024-01-06 RX ORDER — ACETAMINOPHEN 500 MG
500 TABLET ORAL EVERY 6 HOURS PRN
Status: DISCONTINUED | OUTPATIENT
Start: 2024-01-06 | End: 2024-01-06 | Stop reason: SDUPTHER

## 2024-01-06 RX ORDER — LISINOPRIL AND HYDROCHLOROTHIAZIDE 12.5; 2 MG/1; MG/1
0.5 TABLET ORAL DAILY
Status: DISCONTINUED | OUTPATIENT
Start: 2024-01-06 | End: 2024-01-07 | Stop reason: CLARIF

## 2024-01-06 RX ORDER — METHOTREXATE 2.5 MG/1
15 TABLET ORAL WEEKLY
COMMUNITY

## 2024-01-06 RX ORDER — 0.9 % SODIUM CHLORIDE 0.9 %
30 INTRAVENOUS SOLUTION INTRAVENOUS ONCE
Status: DISCONTINUED | OUTPATIENT
Start: 2024-01-06 | End: 2024-01-09 | Stop reason: HOSPADM

## 2024-01-06 RX ORDER — METHYLPREDNISOLONE SODIUM SUCCINATE 40 MG/ML
40 INJECTION INTRAMUSCULAR; INTRAVENOUS EVERY 12 HOURS
Status: DISCONTINUED | OUTPATIENT
Start: 2024-01-06 | End: 2024-01-09 | Stop reason: HOSPADM

## 2024-01-06 RX ORDER — NAPROXEN 500 MG/1
500 TABLET ORAL 2 TIMES DAILY WITH MEALS
COMMUNITY

## 2024-01-06 RX ORDER — ONDANSETRON 2 MG/ML
4 INJECTION INTRAMUSCULAR; INTRAVENOUS EVERY 6 HOURS PRN
Status: DISCONTINUED | OUTPATIENT
Start: 2024-01-06 | End: 2024-01-09 | Stop reason: HOSPADM

## 2024-01-06 RX ORDER — ACETAMINOPHEN 650 MG/1
650 SUPPOSITORY RECTAL EVERY 6 HOURS PRN
Status: DISCONTINUED | OUTPATIENT
Start: 2024-01-06 | End: 2024-01-09 | Stop reason: HOSPADM

## 2024-01-06 RX ORDER — HYDROXYZINE HYDROCHLORIDE 25 MG/1
25 TABLET, FILM COATED ORAL NIGHTLY PRN
Status: DISCONTINUED | OUTPATIENT
Start: 2024-01-06 | End: 2024-01-09 | Stop reason: HOSPADM

## 2024-01-06 RX ORDER — CHLORAL HYDRATE 500 MG
2000 CAPSULE ORAL DAILY
COMMUNITY

## 2024-01-06 RX ORDER — ENOXAPARIN SODIUM 100 MG/ML
40 INJECTION SUBCUTANEOUS DAILY
Status: DISCONTINUED | OUTPATIENT
Start: 2024-01-06 | End: 2024-01-09 | Stop reason: HOSPADM

## 2024-01-06 RX ORDER — SODIUM CHLORIDE 0.9 % (FLUSH) 0.9 %
5-40 SYRINGE (ML) INJECTION PRN
Status: DISCONTINUED | OUTPATIENT
Start: 2024-01-06 | End: 2024-01-09 | Stop reason: HOSPADM

## 2024-01-06 RX ORDER — PANTOPRAZOLE SODIUM 40 MG/1
40 TABLET, DELAYED RELEASE ORAL
Status: DISCONTINUED | OUTPATIENT
Start: 2024-01-06 | End: 2024-01-09 | Stop reason: HOSPADM

## 2024-01-06 RX ORDER — GUAIFENESIN 600 MG/1
600 TABLET, EXTENDED RELEASE ORAL 2 TIMES DAILY
Status: DISCONTINUED | OUTPATIENT
Start: 2024-01-06 | End: 2024-01-09 | Stop reason: HOSPADM

## 2024-01-06 RX ORDER — METHYLPREDNISOLONE SODIUM SUCCINATE 125 MG/2ML
125 INJECTION INTRAMUSCULAR; INTRAVENOUS ONCE
Status: COMPLETED | OUTPATIENT
Start: 2024-01-06 | End: 2024-01-06

## 2024-01-06 RX ORDER — ASPIRIN 81 MG/1
81 TABLET, CHEWABLE ORAL DAILY
Status: DISCONTINUED | OUTPATIENT
Start: 2024-01-06 | End: 2024-01-09 | Stop reason: HOSPADM

## 2024-01-06 RX ORDER — GABAPENTIN 100 MG/1
200 CAPSULE ORAL 3 TIMES DAILY
COMMUNITY

## 2024-01-06 RX ORDER — ALBUTEROL SULFATE 0.83 MG/ML
2.5 SOLUTION RESPIRATORY (INHALATION) EVERY 6 HOURS PRN
Status: DISCONTINUED | OUTPATIENT
Start: 2024-01-06 | End: 2024-01-09 | Stop reason: HOSPADM

## 2024-01-06 RX ORDER — ALBUTEROL SULFATE 90 UG/1
2 INHALANT RESPIRATORY (INHALATION) EVERY 4 HOURS PRN
Status: CANCELLED | OUTPATIENT
Start: 2024-01-06

## 2024-01-06 RX ORDER — ACETAMINOPHEN 325 MG/1
650 TABLET ORAL EVERY 6 HOURS PRN
Status: DISCONTINUED | OUTPATIENT
Start: 2024-01-06 | End: 2024-01-09 | Stop reason: HOSPADM

## 2024-01-06 RX ORDER — SODIUM CHLORIDE 0.9 % (FLUSH) 0.9 %
5-40 SYRINGE (ML) INJECTION EVERY 12 HOURS SCHEDULED
Status: DISCONTINUED | OUTPATIENT
Start: 2024-01-06 | End: 2024-01-09 | Stop reason: HOSPADM

## 2024-01-06 RX ORDER — POLYETHYLENE GLYCOL 3350 17 G/17G
17 POWDER, FOR SOLUTION ORAL DAILY PRN
Status: DISCONTINUED | OUTPATIENT
Start: 2024-01-06 | End: 2024-01-09 | Stop reason: HOSPADM

## 2024-01-06 RX ORDER — 0.9 % SODIUM CHLORIDE 0.9 %
500 INTRAVENOUS SOLUTION INTRAVENOUS ONCE
Status: DISCONTINUED | OUTPATIENT
Start: 2024-01-06 | End: 2024-01-06

## 2024-01-06 RX ORDER — LORATADINE 10 MG/1
10 TABLET ORAL DAILY
COMMUNITY

## 2024-01-06 RX ORDER — ALBUTEROL SULFATE 90 UG/1
2 INHALANT RESPIRATORY (INHALATION) EVERY 4 HOURS PRN
Status: DISCONTINUED | OUTPATIENT
Start: 2024-01-06 | End: 2024-01-09 | Stop reason: HOSPADM

## 2024-01-06 RX ORDER — AZITHROMYCIN 500 MG/1
500 TABLET, FILM COATED ORAL DAILY
Status: COMPLETED | OUTPATIENT
Start: 2024-01-06 | End: 2024-01-08

## 2024-01-06 RX ORDER — IPRATROPIUM BROMIDE AND ALBUTEROL SULFATE 2.5; .5 MG/3ML; MG/3ML
2 SOLUTION RESPIRATORY (INHALATION) ONCE
Status: DISCONTINUED | OUTPATIENT
Start: 2024-01-06 | End: 2024-01-06

## 2024-01-06 RX ORDER — BENZONATATE 100 MG/1
100 CAPSULE ORAL 3 TIMES DAILY PRN
Status: DISCONTINUED | OUTPATIENT
Start: 2024-01-06 | End: 2024-01-09 | Stop reason: HOSPADM

## 2024-01-06 RX ORDER — PREDNISONE 20 MG/1
40 TABLET ORAL DAILY
Status: DISCONTINUED | OUTPATIENT
Start: 2024-01-06 | End: 2024-01-06

## 2024-01-06 RX ORDER — ONDANSETRON 4 MG/1
4 TABLET, ORALLY DISINTEGRATING ORAL EVERY 8 HOURS PRN
Status: DISCONTINUED | OUTPATIENT
Start: 2024-01-06 | End: 2024-01-09 | Stop reason: HOSPADM

## 2024-01-06 RX ADMIN — IPRATROPIUM BROMIDE AND ALBUTEROL SULFATE 2 DOSE: .5; 3 SOLUTION RESPIRATORY (INHALATION) at 02:05

## 2024-01-06 RX ADMIN — LISINOPRIL AND HYDROCHLOROTHIAZIDE 0.5 TABLET: 12.5; 2 TABLET ORAL at 09:20

## 2024-01-06 RX ADMIN — IPRATROPIUM BROMIDE 0.5 MG: 0.5 SOLUTION RESPIRATORY (INHALATION) at 07:59

## 2024-01-06 RX ADMIN — PREDNISONE 40 MG: 20 TABLET ORAL at 09:20

## 2024-01-06 RX ADMIN — AZITHROMYCIN 500 MG: 500 TABLET, FILM COATED ORAL at 09:20

## 2024-01-06 RX ADMIN — CEFEPIME 2000 MG: 2 INJECTION, POWDER, FOR SOLUTION INTRAVENOUS at 03:34

## 2024-01-06 RX ADMIN — GUAIFENESIN 600 MG: 600 TABLET ORAL at 20:20

## 2024-01-06 RX ADMIN — CYANOCOBALAMIN TAB 500 MCG 500 MCG: 500 TAB at 09:20

## 2024-01-06 RX ADMIN — VANCOMYCIN HYDROCHLORIDE 1000 MG: 1 INJECTION, POWDER, LYOPHILIZED, FOR SOLUTION INTRAVENOUS at 04:21

## 2024-01-06 RX ADMIN — MOMETASONE FUROATE AND FORMOTEROL FUMARATE DIHYDRATE 2 PUFF: 100; 5 AEROSOL RESPIRATORY (INHALATION) at 08:11

## 2024-01-06 RX ADMIN — MOMETASONE FUROATE AND FORMOTEROL FUMARATE DIHYDRATE 2 PUFF: 100; 5 AEROSOL RESPIRATORY (INHALATION) at 20:05

## 2024-01-06 RX ADMIN — ENOXAPARIN SODIUM 40 MG: 100 INJECTION SUBCUTANEOUS at 09:21

## 2024-01-06 RX ADMIN — IPRATROPIUM BROMIDE 0.5 MG: 0.5 SOLUTION RESPIRATORY (INHALATION) at 11:52

## 2024-01-06 RX ADMIN — GUAIFENESIN 600 MG: 600 TABLET ORAL at 09:20

## 2024-01-06 RX ADMIN — SODIUM CHLORIDE, POTASSIUM CHLORIDE, SODIUM LACTATE AND CALCIUM CHLORIDE: 600; 310; 30; 20 INJECTION, SOLUTION INTRAVENOUS at 07:02

## 2024-01-06 RX ADMIN — PANTOPRAZOLE SODIUM 40 MG: 40 TABLET, DELAYED RELEASE ORAL at 09:20

## 2024-01-06 RX ADMIN — TIOTROPIUM BROMIDE INHALATION SPRAY 2 PUFF: 3.12 SPRAY, METERED RESPIRATORY (INHALATION) at 08:09

## 2024-01-06 RX ADMIN — IPRATROPIUM BROMIDE 0.5 MG: 0.5 SOLUTION RESPIRATORY (INHALATION) at 20:05

## 2024-01-06 RX ADMIN — IPRATROPIUM BROMIDE 0.5 MG: 0.5 SOLUTION RESPIRATORY (INHALATION) at 15:50

## 2024-01-06 RX ADMIN — Medication 10 ML: at 20:21

## 2024-01-06 RX ADMIN — METHYLPREDNISOLONE SODIUM SUCCINATE 40 MG: 40 INJECTION, POWDER, LYOPHILIZED, FOR SOLUTION INTRAMUSCULAR; INTRAVENOUS at 18:43

## 2024-01-06 RX ADMIN — METHYLPREDNISOLONE SODIUM SUCCINATE 125 MG: 125 INJECTION INTRAMUSCULAR; INTRAVENOUS at 03:14

## 2024-01-06 RX ADMIN — ASPIRIN 81 MG: 81 TABLET, CHEWABLE ORAL at 09:20

## 2024-01-06 ASSESSMENT — LIFESTYLE VARIABLES
HOW MANY STANDARD DRINKS CONTAINING ALCOHOL DO YOU HAVE ON A TYPICAL DAY: PATIENT DOES NOT DRINK
HOW OFTEN DO YOU HAVE A DRINK CONTAINING ALCOHOL: NEVER

## 2024-01-06 NOTE — ED NOTES
ED handoff report provided to FAROOQ Lazaro. Patient to be transported to Room 5262 via stretcher. IV site clean, dry, and intact. Vitals stable. Patient updated on plan of care. All questions answered.

## 2024-01-06 NOTE — PROGRESS NOTES
V2.0    [facility] Progress Note      Name:  Lucio Padilla /Age/Sex: 1945  (78 y.o. male)   MRN & CSN:  [id number] & [id number] Encounter Date/Time: 2024 11:35 AM EST   Location:  [id number] PCP: Olimpia Lu     Attending:Elbert Monet MD       Hospital Day: 2    Assessment and Recommendations   Lucio Padilla is a 78 y.o. male with pmh of COPD, rheumatoid arthritis, hypertension who presents with COPD with acute exacerbation (HCC)      Plan:   -COPD with acute exacerbation  Failed outpatient treatment  Continue bronchodilators, steroids, azithromycin  Pulmonary consult appreciated    -Essential hypertension--stable-continue lisinopril/HCTZ  -Pulmonary cachexia with a BMI of 17.6  -Seropositive rheumatoid arthritis--appears to be on weekly methotrexate rheumatology note 23--stable    Disposition  Home in 1 to 2 days pending clinical improvement    Diet ADULT DIET; Regular   DVT Prophylaxis [x] Lovenox, []  Heparin, [] SCDs, [] Ambulation,  [] Eliquis, [] Xarelto  [] Coumadin   Code Status Full Code   Disposition    Surrogate Decision Maker/ POA       Personally reviewed Lab Studies and Imaging     Reviewed all pertinent labs and images including CBC  Reviewed and updated all current medications  Reviewed all imaging studies including chest x-ray which is clear              Subjective:   Feels better today, still has exertional dyspnea, cough productive of yellow sputum..  No fevers or chills    Pertinent positives and negatives discussed in HPI    Objective:     Intake/Output Summary (Last 24 hours) at 2024 0914  Last data filed at 2024 0456  Gross per 24 hour   Intake 434.88 ml   Output 2150 ml   Net -1715.12 ml      Vitals:   Vitals:    24 0050 24 0457 24 0715 24 0834   BP: 125/71 (!) 142/71 (!) 157/91    Pulse: 73 87 72 82   Resp:    Temp: 97.9 °F (36.6 °C) 98.1 °F (36.7 °C) 97.7 °F (36.5 °C)    TempSrc: Oral Oral Oral

## 2024-01-06 NOTE — CONSULTS
Clinical Pharmacy Note  Vancomycin Consult    Pharmacy consult received for one-time dose of vancomycin in the Emergency Department per Dr. gAgarwal.    Ht Readings from Last 1 Encounters:   01/06/24 1.753 m (5' 9\")        Wt Readings from Last 1 Encounters:   01/06/24 54.2 kg (119 lb 7.8 oz)         Assessment/Plan:  Vancomycin 1000 mg x 1 in ED.  If vancomycin is to continue on admission and pharmacy is to manage dosing, please re-consult with admission orders.          
03/12/2021.  PET-CT 03/26/2021.    HISTORY:  ORDERING SYSTEM PROVIDED HISTORY: Abnormal CT of the chest  TECHNOLOGIST PROVIDED HISTORY:  Reason for exam:->compare vs previous  Reason for Exam: compare vs previous, Abnormal CT of the chest, Lung nodules    FINDINGS:  Mediastinum: The heart and great vessels are normal in size.  Calcified  atheromatous plaque and coronary calcifications are noted.  No pericardial  effusion.  No enlarged or suspicious-appearing lymph nodes are identified.    Lungs/pleura: Advanced emphysematous changes with scarring and distortion in  the lung apices again demonstrated.  Chronic cavitation in the left apex  appears unchanged.  Previously described discrete nodules in the lateral  right upper lobe now appear more confluent with a curvilinear appearance and  punctate calcification suggestive of developing scar.  Interval development  of an irregular solid 12 mm nodule in the superior segment of the right lower  lobe on axial image 51.  Additional irregular inflammatory type opacities are  also present in the medial superior segment of the right lower lobe on axial  image 61, scattered in the right lower lobe and left lower lobe.  No  effusion.  The central airway is patent.    Upper Abdomen: No new findings identified.    Soft Tissues/Bones: No new findings identified.    Impression  1.  Interval development of multiple irregular opacities in the lower lobes  favoring an inflammatory process or infection.  Short-term CT follow-up in 3  months is recommended to ensure resolution.    2.  Previously described discrete nodules in the right upper lobe now appear  more confluent and part of a probable coalescing scar for which attention to  on follow-up imaging is recommended.    RECOMMENDATIONS:  12 mm suspicious right solid pulmonary nodule. Consider a non-contrast Chest  CT at 3 months, a PET/CT, or tissue sampling.    These guidelines do not apply to immunocompromised patients and patients

## 2024-01-06 NOTE — ED PROVIDER NOTES
significant change at 28. Repeat lactic 1.8. covid and influenza negative. Hospitalist consulted for admission. Admit.     CONSULTS: (Who and What was discussed)  PHARMACY TO DOSE VANCOMYCIN  IP CONSULT TO HOSPITALIST  IP CONSULT TO PULMONOLOGY    Is this patient to be included in the SEP-1 Core Measure due to severe sepsis or septic shock?   No   Exclusion criteria - the patient is NOT to be included for SEP-1 Core Measure due to:  Alternative explanation for abnormal labs/vitals that do not relate to sepsis, see MDM for further explanation- COPD exacerbation      During the patient's ED course, the patient was given:  Medications   sodium chloride 0.9 % bolus 1,626 mL (has no administration in time range)   albuterol (PROVENTIL) (2.5 MG/3ML) 0.083% nebulizer solution 2.5 mg (has no administration in time range)   albuterol sulfate HFA (PROVENTIL;VENTOLIN;PROAIR) 108 (90 Base) MCG/ACT inhaler 2 puff (has no administration in time range)   aspirin chewable tablet 81 mg (has no administration in time range)   vitamin B-12 (CYANOCOBALAMIN) tablet 500 mcg (has no administration in time range)   hydrOXYzine HCl (ATARAX) tablet 25 mg (has no administration in time range)   pantoprazole (PROTONIX) tablet 40 mg (has no administration in time range)   tiotropium (SPIRIVA RESPIMAT) 2.5 MCG/ACT inhaler 2 puff (2 puffs Inhalation Given 1/6/24 0809)   sodium chloride flush 0.9 % injection 5-40 mL (has no administration in time range)   sodium chloride flush 0.9 % injection 5-40 mL (has no administration in time range)   0.9 % sodium chloride infusion (has no administration in time range)   ondansetron (ZOFRAN-ODT) disintegrating tablet 4 mg (has no administration in time range)     Or   ondansetron (ZOFRAN) injection 4 mg (has no administration in time range)   polyethylene glycol (GLYCOLAX) packet 17 g (has no administration in time range)   enoxaparin (LOVENOX) injection 40 mg (has no administration in time range)

## 2024-01-06 NOTE — H&P
MD   hydrOXYzine (ATARAX) 25 MG tablet Take 1 tablet by mouth nightly as needed for Itching    Sayra Piña MD   lisinopril-hydrochlorothiazide (PRINZIDE;ZESTORETIC) 20-25 MG per tablet Take 1 tablet by mouth daily    Sayra Piña MD   acetaminophen (TYLENOL) 500 MG tablet Take 1 tablet by mouth every 6 hours as needed for Pain    Sayra Piña MD   Cyanocobalamin (B-12) 1000 MCG CAPS Take 0.5 capsules by mouth daily    Sayra Piña MD   tiotropium (SPIRIVA) 18 MCG inhalation capsule Inhale 1 capsule into the lungs daily    Sayra Piña MD   albuterol (PROVENTIL) (2.5 MG/3ML) 0.083% nebulizer solution Take 3 mLs by nebulization every 6 hours as needed for Wheezing    Sayra Piña MD   Fluticasone-Salmeterol (ADVAIR DISKUS IN) Inhale into the lungs 2 times daily    Sayra Piña MD   fluocinonide (LIDEX) 0.05 % ointment Apply 2 applicators topically 2 times daily as needed (dry skin on arms and legs). Apply topically 2 times daily.    ProviderSayra MD   aspirin 81 MG tablet Take 1 tablet by mouth daily    Sayra Piña MD       Labs: Personally reviewed and interpreted for clinical significance.   Recent Labs     01/06/24  0206   WBC 19.1*   HGB 11.0*   HCT 32.8*        Recent Labs     01/06/24  0206      K 4.0      CO2 24   BUN 21*   CREATININE 0.8   CALCIUM 8.6     Recent Labs     01/06/24  0206 01/06/24  0309   PROBNP 711*  --    TROPHS 23* 28*     No results for input(s): \"LABA1C\" in the last 72 hours.  Recent Labs     01/06/24  0206   AST 12*   ALT 8*   BILITOT 0.3   ALKPHOS 87     Recent Labs     01/06/24  0206   INR 0.99        Christiano Johnson MD

## 2024-01-06 NOTE — ED TRIAGE NOTES
Pt arrived to ED via EMS with c/o SOB that started tonight. Pt has had a respiratory infection for 2 weeks. Pt has been on steroids for 3 days. Pt has had a productive cough for 2 weeks. Pt denies chest pain. Pt is following pulmonologist for respiratory infection. Pt is alert and oriented; VSS. Dr. Jasen MARIE at bedside at time of triage.

## 2024-01-06 NOTE — PROGRESS NOTES
Medication Reconciliation    List of medications patient is currently taking is complete.     Source of information: 1. Conversation with patient at bedside                                      2. EPIC records                                       3. Dispense history     Allergies  Patient has no known allergies.     Notes regarding home medications:   1. Added methotrexate, naproxen, vitamin D, gabapentin, loratadine to the list

## 2024-01-07 LAB
ANION GAP SERPL CALCULATED.3IONS-SCNC: 12 MMOL/L (ref 3–16)
BASOPHILS # BLD: 0 K/UL (ref 0–0.2)
BASOPHILS NFR BLD: 0.1 %
BUN SERPL-MCNC: 22 MG/DL (ref 7–20)
CALCIUM SERPL-MCNC: 9.2 MG/DL (ref 8.3–10.6)
CHLORIDE SERPL-SCNC: 103 MMOL/L (ref 99–110)
CO2 SERPL-SCNC: 24 MMOL/L (ref 21–32)
CREAT SERPL-MCNC: 0.8 MG/DL (ref 0.8–1.3)
DEPRECATED RDW RBC AUTO: 20.1 % (ref 12.4–15.4)
EOSINOPHIL # BLD: 0 K/UL (ref 0–0.6)
EOSINOPHIL NFR BLD: 0 %
GFR SERPLBLD CREATININE-BSD FMLA CKD-EPI: >60 ML/MIN/{1.73_M2}
GLUCOSE SERPL-MCNC: 151 MG/DL (ref 70–99)
HCT VFR BLD AUTO: 33.5 % (ref 40.5–52.5)
HGB BLD-MCNC: 11 G/DL (ref 13.5–17.5)
LYMPHOCYTES # BLD: 1 K/UL (ref 1–5.1)
LYMPHOCYTES NFR BLD: 3.8 %
MCH RBC QN AUTO: 27.7 PG (ref 26–34)
MCHC RBC AUTO-ENTMCNC: 32.7 G/DL (ref 31–36)
MCV RBC AUTO: 84.7 FL (ref 80–100)
MONOCYTES # BLD: 0.7 K/UL (ref 0–1.3)
MONOCYTES NFR BLD: 2.6 %
NEUTROPHILS # BLD: 24.8 K/UL (ref 1.7–7.7)
NEUTROPHILS NFR BLD: 93.5 %
PLATELET # BLD AUTO: 369 K/UL (ref 135–450)
PMV BLD AUTO: 6.4 FL (ref 5–10.5)
POTASSIUM SERPL-SCNC: 3.6 MMOL/L (ref 3.5–5.1)
RBC # BLD AUTO: 3.96 M/UL (ref 4.2–5.9)
SODIUM SERPL-SCNC: 139 MMOL/L (ref 136–145)
WBC # BLD AUTO: 26.5 K/UL (ref 4–11)

## 2024-01-07 PROCEDURE — 6370000000 HC RX 637 (ALT 250 FOR IP): Performed by: STUDENT IN AN ORGANIZED HEALTH CARE EDUCATION/TRAINING PROGRAM

## 2024-01-07 PROCEDURE — 87205 SMEAR GRAM STAIN: CPT

## 2024-01-07 PROCEDURE — 94640 AIRWAY INHALATION TREATMENT: CPT

## 2024-01-07 PROCEDURE — 93005 ELECTROCARDIOGRAM TRACING: CPT | Performed by: HOSPITALIST

## 2024-01-07 PROCEDURE — 6360000002 HC RX W HCPCS: Performed by: INTERNAL MEDICINE

## 2024-01-07 PROCEDURE — 80048 BASIC METABOLIC PNL TOTAL CA: CPT

## 2024-01-07 PROCEDURE — 85025 COMPLETE CBC W/AUTO DIFF WBC: CPT

## 2024-01-07 PROCEDURE — 6360000002 HC RX W HCPCS: Performed by: STUDENT IN AN ORGANIZED HEALTH CARE EDUCATION/TRAINING PROGRAM

## 2024-01-07 PROCEDURE — 2580000003 HC RX 258

## 2024-01-07 PROCEDURE — 36415 COLL VENOUS BLD VENIPUNCTURE: CPT

## 2024-01-07 PROCEDURE — 96372 THER/PROPH/DIAG INJ SC/IM: CPT

## 2024-01-07 PROCEDURE — 96376 TX/PRO/DX INJ SAME DRUG ADON: CPT

## 2024-01-07 PROCEDURE — 87070 CULTURE OTHR SPECIMN AEROBIC: CPT

## 2024-01-07 PROCEDURE — G0378 HOSPITAL OBSERVATION PER HR: HCPCS

## 2024-01-07 PROCEDURE — 2580000003 HC RX 258: Performed by: STUDENT IN AN ORGANIZED HEALTH CARE EDUCATION/TRAINING PROGRAM

## 2024-01-07 PROCEDURE — 99233 SBSQ HOSP IP/OBS HIGH 50: CPT | Performed by: INTERNAL MEDICINE

## 2024-01-07 PROCEDURE — 94760 N-INVAS EAR/PLS OXIMETRY 1: CPT

## 2024-01-07 RX ORDER — HYDROCHLOROTHIAZIDE 25 MG/1
6.25 TABLET ORAL DAILY
Status: DISCONTINUED | OUTPATIENT
Start: 2024-01-07 | End: 2024-01-09 | Stop reason: HOSPADM

## 2024-01-07 RX ORDER — WATER 10 ML/10ML
INJECTION INTRAMUSCULAR; INTRAVENOUS; SUBCUTANEOUS
Status: COMPLETED
Start: 2024-01-07 | End: 2024-01-07

## 2024-01-07 RX ORDER — LISINOPRIL 10 MG/1
10 TABLET ORAL DAILY
Status: DISCONTINUED | OUTPATIENT
Start: 2024-01-07 | End: 2024-01-09 | Stop reason: HOSPADM

## 2024-01-07 RX ADMIN — TIOTROPIUM BROMIDE INHALATION SPRAY 2 PUFF: 3.12 SPRAY, METERED RESPIRATORY (INHALATION) at 08:32

## 2024-01-07 RX ADMIN — PANTOPRAZOLE SODIUM 40 MG: 40 TABLET, DELAYED RELEASE ORAL at 05:30

## 2024-01-07 RX ADMIN — LISINOPRIL 10 MG: 10 TABLET ORAL at 09:52

## 2024-01-07 RX ADMIN — Medication 10 ML: at 20:03

## 2024-01-07 RX ADMIN — IPRATROPIUM BROMIDE 0.5 MG: 0.5 SOLUTION RESPIRATORY (INHALATION) at 15:49

## 2024-01-07 RX ADMIN — MOMETASONE FUROATE AND FORMOTEROL FUMARATE DIHYDRATE 2 PUFF: 100; 5 AEROSOL RESPIRATORY (INHALATION) at 08:32

## 2024-01-07 RX ADMIN — METHYLPREDNISOLONE SODIUM SUCCINATE 40 MG: 40 INJECTION, POWDER, LYOPHILIZED, FOR SOLUTION INTRAMUSCULAR; INTRAVENOUS at 18:08

## 2024-01-07 RX ADMIN — WATER 10 ML: 1 INJECTION INTRAMUSCULAR; INTRAVENOUS; SUBCUTANEOUS at 05:30

## 2024-01-07 RX ADMIN — METHYLPREDNISOLONE SODIUM SUCCINATE 40 MG: 40 INJECTION, POWDER, LYOPHILIZED, FOR SOLUTION INTRAMUSCULAR; INTRAVENOUS at 05:30

## 2024-01-07 RX ADMIN — IPRATROPIUM BROMIDE 0.5 MG: 0.5 SOLUTION RESPIRATORY (INHALATION) at 11:47

## 2024-01-07 RX ADMIN — AZITHROMYCIN 500 MG: 500 TABLET, FILM COATED ORAL at 08:13

## 2024-01-07 RX ADMIN — CYANOCOBALAMIN TAB 500 MCG 500 MCG: 500 TAB at 08:13

## 2024-01-07 RX ADMIN — GUAIFENESIN 600 MG: 600 TABLET ORAL at 20:02

## 2024-01-07 RX ADMIN — ENOXAPARIN SODIUM 40 MG: 100 INJECTION SUBCUTANEOUS at 08:13

## 2024-01-07 RX ADMIN — GUAIFENESIN 600 MG: 600 TABLET ORAL at 08:13

## 2024-01-07 RX ADMIN — ASPIRIN 81 MG: 81 TABLET, CHEWABLE ORAL at 08:13

## 2024-01-07 RX ADMIN — HYDROCHLOROTHIAZIDE 6.25 MG: 25 TABLET ORAL at 09:52

## 2024-01-07 RX ADMIN — Medication 10 ML: at 08:14

## 2024-01-07 RX ADMIN — IPRATROPIUM BROMIDE 0.5 MG: 0.5 SOLUTION RESPIRATORY (INHALATION) at 19:41

## 2024-01-07 RX ADMIN — IPRATROPIUM BROMIDE 0.5 MG: 0.5 SOLUTION RESPIRATORY (INHALATION) at 08:32

## 2024-01-07 RX ADMIN — MOMETASONE FUROATE AND FORMOTEROL FUMARATE DIHYDRATE 2 PUFF: 100; 5 AEROSOL RESPIRATORY (INHALATION) at 19:42

## 2024-01-07 ASSESSMENT — PAIN SCALES - GENERAL: PAINLEVEL_OUTOF10: 0

## 2024-01-07 NOTE — PLAN OF CARE
Problem: Discharge Planning  Goal: Discharge to home or other facility with appropriate resources  1/7/2024 1014 by Vincenzo Corona, RN  Outcome: Progressing     Problem: Safety - Adult  Goal: Free from fall injury  1/7/2024 1014 by Vincenzo Corona, RN  Outcome: Progressing     Problem: Skin/Tissue Integrity  Goal: Absence of new skin breakdown  Description: 1.  Monitor for areas of redness and/or skin breakdown  2.  Assess vascular access sites hourly  3.  Every 4-6 hours minimum:  Change oxygen saturation probe site  4.  Every 4-6 hours:  If on nasal continuous positive airway pressure, respiratory therapy assess nares and determine need for appliance change or resting period.  1/7/2024 1014 by Vincenzo Corona, RN  Outcome: Progressing      yes

## 2024-01-07 NOTE — PROGRESS NOTES
Pulmonary Progress Note    Date of Admission: 1/6/2024   LOS: 0 days       CC:  Chief Complaint   Patient presents with    Shortness of Breath     Pt has had a respiratory infection for two weeks and started having SOB tonight. Pt has COPD.         Subjective:  Feeling slightly better but still with dyspnea on exertion     ROS:   No nausea  No Vomiting  No chest pain       Assessment:          Plan:     This note may have been transcribed using Dragon Dictation software. Please disregard any translational errors.       Hospital Day: 0     Emphysema  Dulera  ipratropium     Solumedrol since failed out patient prednisone  Spiriva   improving shortness of breath      Pulmonary nodules   F/u CT needed.     [place] protocol ordered.            Abnormal radiograph  Difficult to assess if has pneumonia with significant abnormality.   Follow Procalcitonin    continue empiric abx   Z pack   Assess more with CT chest            Data:        PHYSICAL EXAM:   Blood pressure (!) 157/91, pulse 82, temperature 97.7 °F (36.5 °C), temperature source Oral, resp. rate 16, height 1.753 m (5' 9\"), weight 52.8 kg (116 lb 6.5 oz), SpO2 97 %.'  Body mass index is 17.19 kg/m².   Gen: No distress.    ENT:   Resp: No accessory muscle use. No crackles. few wheezes. No rhonchi.    CV: Regular rate. Regular rhythm. No murmur or rub. No edema.   Skin: Warm, dry, normal texture and turgor. No nodule on exposed extremities.   M/S: No cyanosis. No clubbing. No joint deformity.  Psych: Oriented x 3. No anxiety.  Awake. Alert. Intact judgement and insight. Good Mood / Affect.  Memory appears in tact       Medications:    Scheduled Meds:   lisinopril  10 mg Oral Daily    And    hydroCHLOROthiazide  6.25 mg Oral Daily    sodium chloride  30 mL/kg IntraVENous Once    aspirin  81 mg Oral Daily    vitamin B-12  500 mcg Oral Daily    pantoprazole  40 mg Oral QAM AC    tiotropium  2 puff Inhalation Daily RT    sodium chloride flush  5-40 mL IntraVENous 2

## 2024-01-08 ENCOUNTER — APPOINTMENT (OUTPATIENT)
Dept: CT IMAGING | Age: 79
DRG: 191 | End: 2024-01-08
Payer: MEDICARE

## 2024-01-08 LAB
ANION GAP SERPL CALCULATED.3IONS-SCNC: 9 MMOL/L (ref 3–16)
BASOPHILS # BLD: 0.1 K/UL (ref 0–0.2)
BASOPHILS NFR BLD: 0.5 %
BUN SERPL-MCNC: 31 MG/DL (ref 7–20)
CALCIUM SERPL-MCNC: 9.3 MG/DL (ref 8.3–10.6)
CHLORIDE SERPL-SCNC: 103 MMOL/L (ref 99–110)
CO2 SERPL-SCNC: 27 MMOL/L (ref 21–32)
CREAT SERPL-MCNC: 0.8 MG/DL (ref 0.8–1.3)
DEPRECATED RDW RBC AUTO: 20.2 % (ref 12.4–15.4)
EKG ATRIAL RATE: 115 BPM
EKG ATRIAL RATE: 96 BPM
EKG DIAGNOSIS: NORMAL
EKG DIAGNOSIS: NORMAL
EKG P AXIS: 79 DEGREES
EKG P AXIS: 87 DEGREES
EKG P-R INTERVAL: 174 MS
EKG P-R INTERVAL: 178 MS
EKG Q-T INTERVAL: 304 MS
EKG Q-T INTERVAL: 336 MS
EKG QRS DURATION: 82 MS
EKG QRS DURATION: 86 MS
EKG QTC CALCULATION (BAZETT): 420 MS
EKG QTC CALCULATION (BAZETT): 424 MS
EKG R AXIS: 64 DEGREES
EKG R AXIS: 81 DEGREES
EKG T AXIS: 78 DEGREES
EKG T AXIS: 89 DEGREES
EKG VENTRICULAR RATE: 115 BPM
EKG VENTRICULAR RATE: 96 BPM
EOSINOPHIL # BLD: 0 K/UL (ref 0–0.6)
EOSINOPHIL NFR BLD: 0 %
GFR SERPLBLD CREATININE-BSD FMLA CKD-EPI: >60 ML/MIN/{1.73_M2}
GLUCOSE SERPL-MCNC: 165 MG/DL (ref 70–99)
GRAM STN SPEC: NORMAL
HCT VFR BLD AUTO: 37.4 % (ref 40.5–52.5)
HGB BLD-MCNC: 12 G/DL (ref 13.5–17.5)
LYMPHOCYTES # BLD: 1.2 K/UL (ref 1–5.1)
LYMPHOCYTES NFR BLD: 5.4 %
MCH RBC QN AUTO: 27.2 PG (ref 26–34)
MCHC RBC AUTO-ENTMCNC: 32 G/DL (ref 31–36)
MCV RBC AUTO: 84.8 FL (ref 80–100)
MONOCYTES # BLD: 0.5 K/UL (ref 0–1.3)
MONOCYTES NFR BLD: 2.1 %
NEUTROPHILS # BLD: 20.4 K/UL (ref 1.7–7.7)
NEUTROPHILS NFR BLD: 92 %
PLATELET # BLD AUTO: 412 K/UL (ref 135–450)
PMV BLD AUTO: 6.3 FL (ref 5–10.5)
POTASSIUM SERPL-SCNC: 3.8 MMOL/L (ref 3.5–5.1)
RBC # BLD AUTO: 4.41 M/UL (ref 4.2–5.9)
SODIUM SERPL-SCNC: 139 MMOL/L (ref 136–145)
WBC # BLD AUTO: 22.1 K/UL (ref 4–11)

## 2024-01-08 PROCEDURE — 94760 N-INVAS EAR/PLS OXIMETRY 1: CPT

## 2024-01-08 PROCEDURE — 94669 MECHANICAL CHEST WALL OSCILL: CPT

## 2024-01-08 PROCEDURE — 2060000000 HC ICU INTERMEDIATE R&B

## 2024-01-08 PROCEDURE — 96372 THER/PROPH/DIAG INJ SC/IM: CPT

## 2024-01-08 PROCEDURE — 2580000003 HC RX 258: Performed by: STUDENT IN AN ORGANIZED HEALTH CARE EDUCATION/TRAINING PROGRAM

## 2024-01-08 PROCEDURE — 93010 ELECTROCARDIOGRAM REPORT: CPT | Performed by: INTERNAL MEDICINE

## 2024-01-08 PROCEDURE — 6370000000 HC RX 637 (ALT 250 FOR IP): Performed by: HOSPITALIST

## 2024-01-08 PROCEDURE — 94640 AIRWAY INHALATION TREATMENT: CPT

## 2024-01-08 PROCEDURE — 80048 BASIC METABOLIC PNL TOTAL CA: CPT

## 2024-01-08 PROCEDURE — 71250 CT THORAX DX C-: CPT

## 2024-01-08 PROCEDURE — 36415 COLL VENOUS BLD VENIPUNCTURE: CPT

## 2024-01-08 PROCEDURE — 6370000000 HC RX 637 (ALT 250 FOR IP): Performed by: STUDENT IN AN ORGANIZED HEALTH CARE EDUCATION/TRAINING PROGRAM

## 2024-01-08 PROCEDURE — 96376 TX/PRO/DX INJ SAME DRUG ADON: CPT

## 2024-01-08 PROCEDURE — 6360000002 HC RX W HCPCS: Performed by: INTERNAL MEDICINE

## 2024-01-08 PROCEDURE — 85025 COMPLETE CBC W/AUTO DIFF WBC: CPT

## 2024-01-08 PROCEDURE — 99233 SBSQ HOSP IP/OBS HIGH 50: CPT | Performed by: INTERNAL MEDICINE

## 2024-01-08 PROCEDURE — 6360000002 HC RX W HCPCS: Performed by: STUDENT IN AN ORGANIZED HEALTH CARE EDUCATION/TRAINING PROGRAM

## 2024-01-08 RX ORDER — NYSTATIN 100000 [USP'U]/ML
5 SUSPENSION ORAL 4 TIMES DAILY
Status: DISCONTINUED | OUTPATIENT
Start: 2024-01-08 | End: 2024-01-09 | Stop reason: HOSPADM

## 2024-01-08 RX ADMIN — NYSTATIN 500000 UNITS: 100000 SUSPENSION ORAL at 20:40

## 2024-01-08 RX ADMIN — CYANOCOBALAMIN TAB 500 MCG 500 MCG: 500 TAB at 08:43

## 2024-01-08 RX ADMIN — GUAIFENESIN 600 MG: 600 TABLET ORAL at 20:40

## 2024-01-08 RX ADMIN — HYDROCHLOROTHIAZIDE 6.25 MG: 25 TABLET ORAL at 08:43

## 2024-01-08 RX ADMIN — Medication 10 ML: at 08:43

## 2024-01-08 RX ADMIN — LISINOPRIL 10 MG: 10 TABLET ORAL at 08:43

## 2024-01-08 RX ADMIN — MOMETASONE FUROATE AND FORMOTEROL FUMARATE DIHYDRATE 2 PUFF: 100; 5 AEROSOL RESPIRATORY (INHALATION) at 10:01

## 2024-01-08 RX ADMIN — TIOTROPIUM BROMIDE INHALATION SPRAY 2 PUFF: 3.12 SPRAY, METERED RESPIRATORY (INHALATION) at 10:02

## 2024-01-08 RX ADMIN — GUAIFENESIN 600 MG: 600 TABLET ORAL at 08:43

## 2024-01-08 RX ADMIN — Medication 10 ML: at 20:41

## 2024-01-08 RX ADMIN — NYSTATIN 500000 UNITS: 100000 SUSPENSION ORAL at 12:31

## 2024-01-08 RX ADMIN — MOMETASONE FUROATE AND FORMOTEROL FUMARATE DIHYDRATE 2 PUFF: 100; 5 AEROSOL RESPIRATORY (INHALATION) at 19:55

## 2024-01-08 RX ADMIN — AZITHROMYCIN 500 MG: 500 TABLET, FILM COATED ORAL at 08:43

## 2024-01-08 RX ADMIN — ASPIRIN 81 MG: 81 TABLET, CHEWABLE ORAL at 08:43

## 2024-01-08 RX ADMIN — ENOXAPARIN SODIUM 40 MG: 100 INJECTION SUBCUTANEOUS at 08:43

## 2024-01-08 RX ADMIN — METHYLPREDNISOLONE SODIUM SUCCINATE 40 MG: 40 INJECTION, POWDER, LYOPHILIZED, FOR SOLUTION INTRAMUSCULAR; INTRAVENOUS at 17:31

## 2024-01-08 RX ADMIN — NYSTATIN 500000 UNITS: 100000 SUSPENSION ORAL at 17:31

## 2024-01-08 RX ADMIN — PANTOPRAZOLE SODIUM 40 MG: 40 TABLET, DELAYED RELEASE ORAL at 06:01

## 2024-01-08 RX ADMIN — BENZONATATE 100 MG: 100 CAPSULE ORAL at 08:43

## 2024-01-08 RX ADMIN — METHYLPREDNISOLONE SODIUM SUCCINATE 40 MG: 40 INJECTION, POWDER, LYOPHILIZED, FOR SOLUTION INTRAMUSCULAR; INTRAVENOUS at 06:01

## 2024-01-08 ASSESSMENT — PAIN SCALES - GENERAL
PAINLEVEL_OUTOF10: 0

## 2024-01-08 NOTE — PROGRESS NOTES
V2.0    [facility] Progress Note      Name:  Lucio Padilla /Age/Sex: 1945  (78 y.o. male)   MRN & CSN:  [id number] & [id number] Encounter Date/Time: 2024 11:35 AM EST   Location:  [id number] PCP: Olimpia Lu     Attending:Elbert Monet MD       Hospital Day: 3    Assessment and Recommendations   Lucio Padilla is a 78 y.o. male with pmh of COPD, rheumatoid arthritis, hypertension who presents with COPD with acute exacerbation (HCC)      Plan:   -COPD with acute exacerbation  Failed outpatient treatment  Continue bronchodilators, steroids, azithromycin  Pulmonary consult appreciated    -Pulmonary nodules--follow-up CT chest per pulmonology  -Essential hypertension--stable-continue lisinopril/HCTZ  -Pulmonary cachexia with a BMI of 17.6  -Seropositive rheumatoid arthritis--appears to be on weekly methotrexate rheumatology note 23--stable    Disposition  Home in a.m.    Diet ADULT DIET; Regular   DVT Prophylaxis [x] Lovenox, []  Heparin, [] SCDs, [] Ambulation,  [] Eliquis, [] Xarelto  [] Coumadin   Code Status Full Code   Disposition    Surrogate Decision Maker/ POA       Personally reviewed Lab Studies and Imaging     Reviewed all pertinent labs and images including CBC  Reviewed and updated all current medications  Reviewed all imaging studies including chest x-ray which is clear              Subjective:   Patient states that he is improving although still with exertional dyspnea but getting close to baseline, still has a productive cough, does not feel ready to leave the hospital today    Pertinent positives and negatives discussed in HPI    Objective:     Intake/Output Summary (Last 24 hours) at 2024 0847  Last data filed at 2024 0739  Gross per 24 hour   Intake 890 ml   Output 1400 ml   Net -510 ml        Vitals:   Vitals:    24 2000 24 0004 24 0350 24 0730   BP: (!) 151/90 118/60 127/88 127/86   Pulse: 73  (!) 104 69   Resp: 18 16 18

## 2024-01-08 NOTE — ACP (ADVANCE CARE PLANNING)
Advance Care Planning     Advance Care Planning Activator (Inpatient)  Conversation Note      Date of ACP Conversation: 1/8/2024     Conversation Conducted with: Patient with Decision Making Capacity    ACP Activator: Amee Rawls RN    Health Care Decision Maker:     Current Designated Health Care Decision Maker:     Primary Decision Maker: Yudith Padilla - Spouse - [phone]    Secondary Decision Maker: Babita Padilla - Child - [phone]    Care Preferences    Ventilation:  \"If you were in your present state of health and suddenly became very ill and were unable to breathe on your own, what would your preference be about the use of a ventilator (breathing machine) if it were available to you?\"      Would the patient desire the use of ventilator (breathing machine)?: yes    \"If your health worsens and it becomes clear that your chance of recovery is unlikely, what would your preference be about the use of a ventilator (breathing machine) if it were available to you?\"     Would the patient desire the use of ventilator (breathing machine)?: Unsure      Resuscitation  \"CPR works best to restart the heart when there is a sudden event, like a heart attack, in someone who is otherwise healthy. Unfortunately, CPR does not typically restart the heart for people who have serious health conditions or who are very sick.\"    \"In the event your heart stopped as a result of an underlying serious health condition, would you want attempts to be made to restart your heart (answer \"yes\" for attempt to resuscitate) or would you prefer a natural death (answer \"no\" for do not attempt to resuscitate)?\" yes       [] Yes   [x] No   Educated Patient / Decision Maker regarding differences between Advance Directives and portable DNR orders.    Length of ACP Conversation in minutes:  5 minutes    Conversation Outcomes:  ACP discussion completed    Follow-up plan:    [] Schedule follow-up conversation to continue

## 2024-01-08 NOTE — PROGRESS NOTES
Pulmonary Progress Note    Date of Admission: 1/6/2024   LOS: 0 days       CC:  Chief Complaint   Patient presents with    Shortness of Breath     Pt has had a respiratory infection for two weeks and started having SOB tonight. Pt has COPD.         Subjective:  Continues to slowly feel better.  Cough with phlegm.    ROS:   No nausea  No Vomiting  No chest pain       Assessment:          Plan:     This note may have been transcribed using Dragon Dictation software. Please disregard any translational errors.       Hospital Day: 0     Emphysema  Dulera  ipratropium     Solumedrol since failed out patient prednisone  Spiriva   improving shortness of breath      Pulmonary nodules   F/u CT needed.   CT completed this morning.  Reviewed not completed        Abnormal radiograph  Difficult to assess if has pneumonia with significant abnormality.     continue empiric abx   Z pack   Assess more with CT chest.  CT interpretation pending.          Possible discharge today, if not, tomorrow.       Data:        PHYSICAL EXAM:   Blood pressure 127/86, pulse (!) 102, temperature 98 °F (36.7 °C), temperature source Oral, resp. rate 18, height 1.753 m (5' 9\"), weight 52.2 kg (115 lb 1.3 oz), SpO2 98 %.'  Body mass index is 16.99 kg/m².   Gen: No distress.    ENT:   Resp: No accessory muscle use. No crackles. no wheezes. No rhonchi.    CV: Regular rate. Regular rhythm. No murmur or rub. No edema.   Skin: Warm, dry, normal texture and turgor. No nodule on exposed extremities.   M/S: No cyanosis. No clubbing. No joint deformity.  Psych: Oriented x 3. No anxiety.  Awake. Alert. Intact judgement and insight. Good Mood / Affect.  Memory appears in tact       Medications:    Scheduled Meds:   lisinopril  10 mg Oral Daily    And    hydroCHLOROthiazide  6.25 mg Oral Daily    sodium chloride  30 mL/kg IntraVENous Once    aspirin  81 mg Oral Daily    vitamin B-12  500 mcg Oral Daily    pantoprazole  40 mg Oral QAM AC    tiotropium  2 puff

## 2024-01-08 NOTE — RT PROTOCOL NOTE
RT Inhaler-Nebulizer Bronchodilator Protocol Note    There is a bronchodilator order in the chart from a provider indicating to follow the RT Bronchodilator Protocol and there is an “Initiate RT Inhaler-Nebulizer Bronchodilator Protocol” order as well (see protocol at bottom of note).    CXR Findings:  No results found.    The findings from the last RT Protocol Assessment were as follows:   History Pulmonary Disease: None or smoker <15 pack years  Respiratory Pattern: Regular pattern and RR 12-20 bpm  Breath Sounds: Slightly diminished and/or crackles  Cough: Strong, spontaneous, non-productive  Indication for Bronchodilator Therapy: Decreased or absent breath sounds  Bronchodilator Assessment Score: 2    Aerosolized bronchodilator medication orders have been revised according to the RT Inhaler-Nebulizer Bronchodilator Protocol below.    Respiratory Therapist to perform RT Therapy Protocol Assessment initially then follow the protocol.  Repeat RT Therapy Protocol Assessment PRN for score 0-3 or on second treatment, BID, and PRN for scores above 3.    No Indications - adjust the frequency to every 6 hours PRN wheezing or bronchospasm, if no treatments needed after 48 hours then discontinue using Per Protocol order mode.     If indication present, adjust the RT bronchodilator orders based on the Bronchodilator Assessment Score as indicated below.  Use Inhaler orders unless patient has one or more of the following: on home nebulizer, not able to hold breath for 10 seconds, is not alert and oriented, cannot activate and use MDI correctly, or respiratory rate 25 breaths per minute or more, then use the equivalent nebulizer order(s) with same Frequency and PRN reasons based on the score.  If a patient is on this medication at home then do not decrease Frequency below that used at home.    0-3 - enter or revise RT bronchodilator order(s) to equivalent RT Bronchodilator order with Frequency of every 4 hours PRN for wheezing

## 2024-01-08 NOTE — PLAN OF CARE
Problem: Safety - Adult  Goal: Free from fall injury  Outcome: Progressing     Problem: Pain  Goal: Verbalizes/displays adequate comfort level or baseline comfort level  Outcome: Progressing     Problem: Respiratory - Adult  Goal: Achieves optimal ventilation and oxygenation  Outcome: Progressing  Flowsheets (Taken 1/8/2024 0020)  Achieves optimal ventilation and oxygenation:   Assess for changes in respiratory status   Assess for changes in mentation and behavior   Position to facilitate oxygenation and minimize respiratory effort     Problem: Cardiovascular - Adult  Goal: Maintains optimal cardiac output and hemodynamic stability  Outcome: Progressing  Flowsheets (Taken 1/8/2024 0020)  Maintains optimal cardiac output and hemodynamic stability:   Monitor blood pressure and heart rate   Monitor urine output and notify Licensed Independent Practitioner for values outside of normal range   Assess for signs of decreased cardiac output

## 2024-01-08 NOTE — CARE COORDINATION
Case Management Assessment  Initial Evaluation    Date/Time of Evaluation: 1/8/2024 1:25 PM  Assessment Completed by: Amee Rawls RN    If patient is discharged prior to next notation, then this note serves as note for discharge by case management.    Patient Name: Lucio Padilla                   YOB: 1945  Diagnosis: COPD with acute exacerbation (HCC) [J44.1]                   Date / Time: 1/6/2024  1:50 AM    Patient Admission Status: Inpatient   Readmission Risk (Low < 19, Mod (19-27), High > 27): Readmission Risk Score: 12.5    Current PCP: Olimpia Lu  PCP verified by CM? Yes    Chart Reviewed: Yes      History Provided by: Patient  Patient Orientation: Alert and Oriented    Patient Cognition: Alert    Hospitalization in the last 30 days (Readmission):  No    If yes, Readmission Assessment in CM Navigator will be completed.    Advance Directives:      Code Status: Full Code   Patient's Primary Decision Maker is: Legal Next of Kin      Discharge Planning:    Patient lives with: Spouse/Significant Other Type of Home: House (ramped entrance)  Primary Care Giver: Self  Patient Support Systems include: Spouse/Significant Other   Current Financial resources: Medicare  Current community resources: None  Current services prior to admission: Durable Medical Equipment            Current DME: [employer], Home Aerosol            Type of Home Care services:  None    ADLS  Prior functional level: Independent in ADLs/IADLs  Current functional level: Assistance with the following:, Mobility    PT AM-PAC:   /24  OT AM-PAC:   /24    Family can provide assistance at DC: Yes  Would you like Case Management to discuss the discharge plan with any other family members/significant others, and if so, who? No  Plans to Return to Present Housing: Yes  Other Identified Issues/Barriers to RETURNING to current housing: no barriers  Potential Assistance needed at discharge: N/A            Potential DME:

## 2024-01-08 NOTE — PLAN OF CARE
Problem: Safety - Adult  Goal: Free from fall injury  1/8/2024 1030 by Emma Martinez, RN  Outcome: Progressing  1/8/2024 0020 by Issac Carter, RN  Outcome: Progressing

## 2024-01-09 VITALS
HEART RATE: 74 BPM | RESPIRATION RATE: 16 BRPM | TEMPERATURE: 97.9 F | HEIGHT: 69 IN | WEIGHT: 112.66 LBS | DIASTOLIC BLOOD PRESSURE: 67 MMHG | BODY MASS INDEX: 16.69 KG/M2 | SYSTOLIC BLOOD PRESSURE: 121 MMHG | OXYGEN SATURATION: 91 %

## 2024-01-09 LAB
ANION GAP SERPL CALCULATED.3IONS-SCNC: 9 MMOL/L (ref 3–16)
BASOPHILS # BLD: 0 K/UL (ref 0–0.2)
BASOPHILS NFR BLD: 0.2 %
BUN SERPL-MCNC: 30 MG/DL (ref 7–20)
CALCIUM SERPL-MCNC: 8.7 MG/DL (ref 8.3–10.6)
CHLORIDE SERPL-SCNC: 101 MMOL/L (ref 99–110)
CO2 SERPL-SCNC: 27 MMOL/L (ref 21–32)
CREAT SERPL-MCNC: 0.9 MG/DL (ref 0.8–1.3)
DEPRECATED RDW RBC AUTO: 19.8 % (ref 12.4–15.4)
EOSINOPHIL # BLD: 0 K/UL (ref 0–0.6)
EOSINOPHIL NFR BLD: 0 %
GFR SERPLBLD CREATININE-BSD FMLA CKD-EPI: >60 ML/MIN/{1.73_M2}
GLUCOSE SERPL-MCNC: 108 MG/DL (ref 70–99)
HCT VFR BLD AUTO: 33.1 % (ref 40.5–52.5)
HGB BLD-MCNC: 11 G/DL (ref 13.5–17.5)
LYMPHOCYTES # BLD: 1.7 K/UL (ref 1–5.1)
LYMPHOCYTES NFR BLD: 11.9 %
MCH RBC QN AUTO: 27.8 PG (ref 26–34)
MCHC RBC AUTO-ENTMCNC: 33.1 G/DL (ref 31–36)
MCV RBC AUTO: 84 FL (ref 80–100)
MONOCYTES # BLD: 1.1 K/UL (ref 0–1.3)
MONOCYTES NFR BLD: 7.4 %
NEUTROPHILS # BLD: 11.5 K/UL (ref 1.7–7.7)
NEUTROPHILS NFR BLD: 80.5 %
PLATELET # BLD AUTO: 328 K/UL (ref 135–450)
PMV BLD AUTO: 6.3 FL (ref 5–10.5)
POTASSIUM SERPL-SCNC: 4.2 MMOL/L (ref 3.5–5.1)
RBC # BLD AUTO: 3.94 M/UL (ref 4.2–5.9)
SODIUM SERPL-SCNC: 137 MMOL/L (ref 136–145)
WBC # BLD AUTO: 14.3 K/UL (ref 4–11)

## 2024-01-09 PROCEDURE — 6370000000 HC RX 637 (ALT 250 FOR IP): Performed by: STUDENT IN AN ORGANIZED HEALTH CARE EDUCATION/TRAINING PROGRAM

## 2024-01-09 PROCEDURE — 96372 THER/PROPH/DIAG INJ SC/IM: CPT

## 2024-01-09 PROCEDURE — 80048 BASIC METABOLIC PNL TOTAL CA: CPT

## 2024-01-09 PROCEDURE — 6370000000 HC RX 637 (ALT 250 FOR IP): Performed by: HOSPITALIST

## 2024-01-09 PROCEDURE — 6360000002 HC RX W HCPCS: Performed by: INTERNAL MEDICINE

## 2024-01-09 PROCEDURE — 36415 COLL VENOUS BLD VENIPUNCTURE: CPT

## 2024-01-09 PROCEDURE — 85025 COMPLETE CBC W/AUTO DIFF WBC: CPT

## 2024-01-09 PROCEDURE — 99232 SBSQ HOSP IP/OBS MODERATE 35: CPT | Performed by: INTERNAL MEDICINE

## 2024-01-09 PROCEDURE — 6360000002 HC RX W HCPCS: Performed by: STUDENT IN AN ORGANIZED HEALTH CARE EDUCATION/TRAINING PROGRAM

## 2024-01-09 PROCEDURE — 96376 TX/PRO/DX INJ SAME DRUG ADON: CPT

## 2024-01-09 PROCEDURE — 94640 AIRWAY INHALATION TREATMENT: CPT

## 2024-01-09 PROCEDURE — 94760 N-INVAS EAR/PLS OXIMETRY 1: CPT

## 2024-01-09 RX ORDER — GUAIFENESIN 600 MG/1
600 TABLET, EXTENDED RELEASE ORAL 2 TIMES DAILY
Qty: 30 TABLET | Refills: 0 | Status: SHIPPED | OUTPATIENT
Start: 2024-01-09 | End: 2025-03-22

## 2024-01-09 RX ORDER — NYSTATIN 100000 [USP'U]/ML
5 SUSPENSION ORAL 4 TIMES DAILY
Qty: 120 ML | Refills: 0 | Status: SHIPPED | OUTPATIENT
Start: 2024-01-09 | End: 2024-01-15

## 2024-01-09 RX ADMIN — CYANOCOBALAMIN TAB 500 MCG 500 MCG: 500 TAB at 09:10

## 2024-01-09 RX ADMIN — NYSTATIN 500000 UNITS: 100000 SUSPENSION ORAL at 09:10

## 2024-01-09 RX ADMIN — ASPIRIN 81 MG: 81 TABLET, CHEWABLE ORAL at 09:10

## 2024-01-09 RX ADMIN — TIOTROPIUM BROMIDE INHALATION SPRAY 2 PUFF: 3.12 SPRAY, METERED RESPIRATORY (INHALATION) at 08:06

## 2024-01-09 RX ADMIN — PANTOPRAZOLE SODIUM 40 MG: 40 TABLET, DELAYED RELEASE ORAL at 05:36

## 2024-01-09 RX ADMIN — METHYLPREDNISOLONE SODIUM SUCCINATE 40 MG: 40 INJECTION, POWDER, LYOPHILIZED, FOR SOLUTION INTRAMUSCULAR; INTRAVENOUS at 05:36

## 2024-01-09 RX ADMIN — LISINOPRIL 10 MG: 10 TABLET ORAL at 09:10

## 2024-01-09 RX ADMIN — GUAIFENESIN 600 MG: 600 TABLET ORAL at 09:10

## 2024-01-09 RX ADMIN — HYDROCHLOROTHIAZIDE 6.25 MG: 25 TABLET ORAL at 09:09

## 2024-01-09 RX ADMIN — MOMETASONE FUROATE AND FORMOTEROL FUMARATE DIHYDRATE 2 PUFF: 100; 5 AEROSOL RESPIRATORY (INHALATION) at 08:06

## 2024-01-09 RX ADMIN — ENOXAPARIN SODIUM 40 MG: 100 INJECTION SUBCUTANEOUS at 09:10

## 2024-01-09 NOTE — PROGRESS NOTES
CLINICAL PHARMACY NOTE: MEDS TO BEDS    Total # of Prescriptions Filled: 1   The following medications were delivered to the patient:  Current Discharge Medication List        START taking these medications    Details   guaiFENesin (MUCINEX) 600 MG extended release tablet Take 1 tablet by mouth 2 times daily  Qty: 30 tablet, Refills: 0           Md added nystatin suspension  pt also got from retail    Additional Documentation:

## 2024-01-09 NOTE — DISCHARGE SUMMARY
Discharge Summary     Name:  Lucio Padilla /Age/Sex: 1945 (78 y.o. male)   Admit Date: 2024  Discharge Date: 24    MRN & CSN:  [id number] & [id number] Encounter Date and Time 24 1:12 PM EST    Attending:  Elbert Monet MD Discharging Provider: Elbert Monet MD         Hospital Course:      Brief HPI: Lucio Padilla is a 78 y.o. male who presented to [facility] with shortness of breath.  PMHx significant for rheumatoid arthritis, emphysema, hypertension, cachexia.  Patient states he has been having an increasing cough over the past week or so and has seen his pulmonologist who ordered a steroid taper and replaced one of his inhalers.  States he was doing well but now his cough is increased to the point where he has shortness of breath with minimal activity.   Brief Problem Based Course:         -COPD with acute exacerbation  Failed outpatient treatment  Treated with bronchodilators, steroids, azithromycin  Improved to baseline prior to discharge     -Pulmonary nodules--follow-up CT chest per pulmonology  -Essential hypertension--stable-continue lisinopril/HCTZ  -Pulmonary cachexia with a BMI of 17.6  -Seropositive rheumatoid arthritis--appears to be on weekly methotrexate rheumatology note 23--stable  -Oral candidiasis--continue nystatin swish and swallow     The patient expressed appropriate understanding of, and agreement with the discharge recommendations, medications, and plan.      Consults this admission:  PHARMACY TO DOSE VANCOMYCIN  IP CONSULT TO HOSPITALIST  IP CONSULT TO PULMONOLOGY     Discharge Diagnosis:   COPD with acute exacerbation (HCC)           Discharge Instruction:   Follow up appointments:   Primary care physician: Olimpia Lu within 2 weeks  Diet: regular diet   Activity: activity as tolerated  Disposition: Discharged to:   [x]Home, []C, []SNF, []Acute Rehab, []Hospice   Condition on discharge: Stable  Labs and Tests to be Followed up as

## 2024-01-09 NOTE — PLAN OF CARE
Problem: Discharge Planning  Goal: Discharge to home or other facility with appropriate resources  Outcome: Adequate for Discharge     Problem: Safety - Adult  Goal: Free from fall injury  1/9/2024 1141 by Angie Moran RN  Outcome: Adequate for Discharge  1/9/2024 0336 by Issac Carter RN  Outcome: Progressing     Problem: Skin/Tissue Integrity  Goal: Absence of new skin breakdown  Description: 1.  Monitor for areas of redness and/or skin breakdown  2.  Assess vascular access sites hourly  3.  Every 4-6 hours minimum:  Change oxygen saturation probe site  4.  Every 4-6 hours:  If on nasal continuous positive airway pressure, respiratory therapy assess nares and determine need for appliance change or resting period.  Outcome: Adequate for Discharge     Problem: Pain  Goal: Verbalizes/displays adequate comfort level or baseline comfort level  1/9/2024 1141 by Angie Moran RN  Outcome: Adequate for Discharge  1/9/2024 0336 by Issac Carter RN  Outcome: Progressing     Problem: Respiratory - Adult  Goal: Achieves optimal ventilation and oxygenation  1/9/2024 1141 by Angie Moran RN  Outcome: Adequate for Discharge  1/9/2024 0336 by Issac Carter RN  Outcome: Progressing  Flowsheets (Taken 1/9/2024 0336)  Achieves optimal ventilation and oxygenation:   Assess for changes in respiratory status   Assess for changes in mentation and behavior   Position to facilitate oxygenation and minimize respiratory effort     Problem: Cardiovascular - Adult  Goal: Maintains optimal cardiac output and hemodynamic stability  1/9/2024 1141 by Angie Moran RN  Outcome: Adequate for Discharge  1/9/2024 0336 by Issac Carter RN  Outcome: Progressing  Flowsheets (Taken 1/9/2024 0336)  Maintains optimal cardiac output and hemodynamic stability:   Monitor blood pressure and heart rate   Monitor urine output and notify Licensed Independent Practitioner for values outside of normal range   Assess for signs of decreased cardiac

## 2024-01-09 NOTE — PROGRESS NOTES
Discharge orders acknowledged by RN . Discharge teaching completed with pt. AVS reviewed and all questions answered. Medication regimen reviewed and pt understands schedule.   IV removed. Bedside monitor removed from pt. 60+ minutes of education completed. Required core measures completed. Pt vitals WDL. Pt discharged with all belongings to home with family support. Pt transported off of unit via wheelchair. No complications.     Electronically signed by Angie Moran RN on 1/9/2024 at 2:50 PM

## 2024-01-09 NOTE — PROGRESS NOTES
Pulmonary Progress Note    Date of Admission: 1/6/2024   LOS: 1 day       CC:  Chief Complaint   Patient presents with    Shortness of Breath     Pt has had a respiratory infection for two weeks and started having SOB tonight. Pt has COPD.         Subjective:  Feeling ready to d/c     ROS:   No nausea  No Vomiting  No chest pain       Assessment:          Plan:     This note may have been transcribed using Dragon Dictation software. Please disregard any translational errors.       Hospital Day: 1     Emphysema  Dulera  ipratropium     Solumedrol since failed out patient prednisone  Spiriva   ok to d/c     Pulmonary nodules   F/u CT needed.   Improved from prior           Abnormal radiograph     Z pack   Ok to d/c          Future Appointments   Date Time Provider Department Center   1/12/2024  7:40 AM [facility]  1 [facility]   7/8/2024  8:20 AM Karl Salinas,   PULM [facility]          Data:        PHYSICAL EXAM:   Blood pressure (!) 153/81, pulse 82, temperature 98 °F (36.7 °C), temperature source Oral, resp. rate 19, height 1.753 m (5' 9\"), weight 51.1 kg (112 lb 10.5 oz), SpO2 93 %.'  Body mass index is 16.64 kg/m².   Gen: No distress.    ENT:   Resp: No accessory muscle use. No crackles. no wheezes. No rhonchi.    CV: Regular rate. Regular rhythm. No murmur or rub. No edema.   Skin: Warm, dry, normal texture and turgor. No nodule on exposed extremities.   M/S: No cyanosis. No clubbing. No joint deformity.  Psych: Oriented x 3. No anxiety.  Awake. Alert. Intact judgement and insight. Good Mood / Affect.  Memory appears in tact       Medications:    Scheduled Meds:   nystatin  5 mL Oral 4x Daily    lisinopril  10 mg Oral Daily    And    hydroCHLOROthiazide  6.25 mg Oral Daily    sodium chloride  30 mL/kg IntraVENous Once    aspirin  81 mg Oral Daily    vitamin B-12  500 mcg Oral Daily    pantoprazole  40 mg Oral QAM AC    tiotropium  2 puff Inhalation Daily RT    sodium chloride flush  5-40 mL

## 2024-01-09 NOTE — CARE COORDINATION
Case Management Discharge Note          Date / Time of Note: 1/9/2024 2:45 PM                  Patient Name: Lucio Padilla   YOB: 1945  Diagnosis: COPD with acute exacerbation (HCC) [J44.1]   Date / Time: 1/6/2024  1:50 AM    Financial:  Payor: [facility] MEDICARE / Plan: [facility] MEDICARE ADVANTAGE / Product Type: *No Product type* /      Pharmacy:    [facility] [address] [place] [address] [address] [facility] -  [phone] - F [phone]  [address] [facility] [address]  Phone: [phone] Fax: [phone]      Assistance purchasing medications?: Potential Assistance Purchasing Medications: No  Assistance provided by Case Management: None at this time    DISCHARGE Disposition: Home- No Services Needed    Transportation:  Transportation PLAN for discharge: family   Mode of Transport: Private Car  Time of Transport: TBD    IMM Completed:   Yes, Case management has presented and reviewed IMM letter #2.       IMM Letter date given:: 01/08/24  IMM Letter time given:: 1320.   Patient and/or family/POA verbalized understanding of their medicare rights and appeal process if needed. Patient and/or family/POA has signed, initialed and placed the date and time on IMM letter #2 on the the appropriate lines. Copy of letter offered and they are aware that the original copy of IMM letter #2 is available prior to discharge from the paper chart on the unit.  Electronic documentation has been entered into epic for IMM letter #2 and original paper copy has been added to the paper chart at the nurses station.     Additional CM Notes: Patient to dc home independently. No dc needs.    Amee Rawls RN  [facility]   Case Management Department  Ph: [phone]

## 2024-01-09 NOTE — PROGRESS NOTES
V2.0  Discharge Summary    Name:  Lucio Padilla /Age/Sex: 1945 (78 y.o. male)   Admit Date: 2024  Discharge Date: 24    MRN & CSN:  [id number] & [id number] Encounter Date and Time 24 1:12 PM EST    Attending:  Elebrt Monet MD Discharging Provider: Elbert Monet MD       Hospital Course:     Brief HPI: Lucio Padilla is a 78 y.o. male who presented to [facility] with shortness of breath.  PMHx significant for rheumatoid arthritis, emphysema, hypertension, cachexia.  Patient states he has been having an increasing cough over the past week or so and has seen his pulmonologist who ordered a steroid taper and replaced one of his inhalers.  States he was doing well but now his cough is increased to the point where he has shortness of breath with minimal activity.   Brief Problem Based Course:       -COPD with acute exacerbation  Failed outpatient treatment  Treated with bronchodilators, steroids, azithromycin  Improved to baseline prior to discharge     -Pulmonary nodules--follow-up CT chest per pulmonology  -Essential hypertension--stable-continue lisinopril/HCTZ  -Pulmonary cachexia with a BMI of 17.6  -Seropositive rheumatoid arthritis--appears to be on weekly methotrexate rheumatology note 23--stable  -Oral candidiasis--continue nystatin swish and swallow    The patient expressed appropriate understanding of, and agreement with the discharge recommendations, medications, and plan.     Consults this admission:  PHARMACY TO DOSE VANCOMYCIN  IP CONSULT TO HOSPITALIST  IP CONSULT TO PULMONOLOGY    Discharge Diagnosis:   COPD with acute exacerbation (HCC)        Discharge Instruction:   Follow up appointments:   Primary care physician: Olimpia Lu within 2 weeks  Diet: regular diet   Activity: activity as tolerated  Disposition: Discharged to:   [x]Home, []HHC, []SNF, []Acute Rehab, []Hospice   Condition on discharge: Stable  Labs and Tests to be Followed up as

## 2024-01-09 NOTE — PLAN OF CARE
Problem: Safety - Adult  Goal: Free from fall injury  Outcome: Progressing     Problem: Pain  Goal: Verbalizes/displays adequate comfort level or baseline comfort level  Outcome: Progressing     Problem: Respiratory - Adult  Goal: Achieves optimal ventilation and oxygenation  Outcome: Progressing  Flowsheets (Taken 1/9/2024 0336)  Achieves optimal ventilation and oxygenation:   Assess for changes in respiratory status   Assess for changes in mentation and behavior   Position to facilitate oxygenation and minimize respiratory effort     Problem: Cardiovascular - Adult  Goal: Maintains optimal cardiac output and hemodynamic stability  Outcome: Progressing  Flowsheets (Taken 1/9/2024 0336)  Maintains optimal cardiac output and hemodynamic stability:   Monitor blood pressure and heart rate   Monitor urine output and notify Licensed Independent Practitioner for values outside of normal range   Assess for signs of decreased cardiac output

## 2024-01-10 ENCOUNTER — TELEPHONE (OUTPATIENT)
Dept: PULMONOLOGY | Age: 79
End: 2024-01-10

## 2024-01-10 LAB
BACTERIA BLD CULT ORG #2: NORMAL
BACTERIA SPEC RESP CULT: NORMAL
GRAM STN SPEC: NORMAL

## 2024-01-10 NOTE — TELEPHONE ENCOUNTER
Delmar from [facility] pharmacy calls in to say that they received a prescription for ProAir and that ProAir is no longer available. He asks that we allow him to fill the generic.  Please review, thank you!

## 2024-07-08 ENCOUNTER — OFFICE VISIT (OUTPATIENT)
Dept: PULMONOLOGY | Age: 79
End: 2024-07-08
Payer: MEDICARE

## 2024-07-08 VITALS
BODY MASS INDEX: 18.37 KG/M2 | HEART RATE: 79 BPM | TEMPERATURE: 98 F | RESPIRATION RATE: 18 BRPM | WEIGHT: 124 LBS | HEIGHT: 69 IN | DIASTOLIC BLOOD PRESSURE: 77 MMHG | SYSTOLIC BLOOD PRESSURE: 132 MMHG | OXYGEN SATURATION: 93 %

## 2024-07-08 DIAGNOSIS — M05.9 RHEUMATOID ARTHRITIS WITH POSITIVE RHEUMATOID FACTOR, INVOLVING UNSPECIFIED SITE (HCC): ICD-10-CM

## 2024-07-08 DIAGNOSIS — R93.89 ABNORMAL CT OF THE CHEST: ICD-10-CM

## 2024-07-08 DIAGNOSIS — J43.1 PANLOBULAR EMPHYSEMA (HCC): Primary | ICD-10-CM

## 2024-07-08 DIAGNOSIS — R91.8 LUNG NODULES: ICD-10-CM

## 2024-07-08 PROCEDURE — 3078F DIAST BP <80 MM HG: CPT | Performed by: INTERNAL MEDICINE

## 2024-07-08 PROCEDURE — 3075F SYST BP GE 130 - 139MM HG: CPT | Performed by: INTERNAL MEDICINE

## 2024-07-08 PROCEDURE — 1123F ACP DISCUSS/DSCN MKR DOCD: CPT | Performed by: INTERNAL MEDICINE

## 2024-07-08 PROCEDURE — 99214 OFFICE O/P EST MOD 30 MIN: CPT | Performed by: INTERNAL MEDICINE

## 2024-07-08 RX ORDER — FLUTICASONE FUROATE, UMECLIDINIUM BROMIDE AND VILANTEROL TRIFENATATE 200; 62.5; 25 UG/1; UG/1; UG/1
1 POWDER RESPIRATORY (INHALATION) DAILY
Qty: 1 EACH | Refills: 11 | Status: SHIPPED | OUTPATIENT
Start: 2024-07-08

## 2024-07-08 RX ORDER — FLUTICASONE FUROATE, UMECLIDINIUM BROMIDE AND VILANTEROL TRIFENATATE 200; 62.5; 25 UG/1; UG/1; UG/1
1 POWDER RESPIRATORY (INHALATION) DAILY
Qty: 1 EACH | Refills: 0 | Status: SHIPPED | COMMUNITY
Start: 2024-07-08

## 2024-07-08 NOTE — PROGRESS NOTES
nodules    4. Rheumatoid arthritis with positive rheumatoid factor, involving unspecified site (HCC)  Have to always consider his waxing and waning imaging findings being related to RA itself or the use of methotrexate.  Next CT can probably wait until 1/25      Pulmonary Rehab:  might be good idea.  Would need PFT on file     Lung Cancer Screening CT:  1/24    Follow up in 4-6 months

## 2024-07-08 NOTE — PATIENT INSTRUCTIONS
Stop qcvar    Start trelegy once a day    Sample of trelegy today    Use albuterol as needed    Follow up in 4-6 months

## 2025-02-06 ENCOUNTER — OFFICE VISIT (OUTPATIENT)
Dept: PULMONOLOGY | Age: 80
End: 2025-02-06
Payer: MEDICARE

## 2025-02-06 VITALS
HEIGHT: 69 IN | HEART RATE: 84 BPM | BODY MASS INDEX: 18.78 KG/M2 | TEMPERATURE: 97.4 F | SYSTOLIC BLOOD PRESSURE: 131 MMHG | RESPIRATION RATE: 18 BRPM | DIASTOLIC BLOOD PRESSURE: 69 MMHG | WEIGHT: 126.8 LBS

## 2025-02-06 DIAGNOSIS — R91.8 LUNG NODULES: ICD-10-CM

## 2025-02-06 DIAGNOSIS — A31.9 ATYPICAL MYCOBACTERIAL DISEASE: ICD-10-CM

## 2025-02-06 DIAGNOSIS — J43.1 PANLOBULAR EMPHYSEMA (HCC): Primary | ICD-10-CM

## 2025-02-06 DIAGNOSIS — M05.9 RHEUMATOID ARTHRITIS WITH POSITIVE RHEUMATOID FACTOR, INVOLVING UNSPECIFIED SITE (HCC): ICD-10-CM

## 2025-02-06 DIAGNOSIS — R93.89 ABNORMAL CT OF THE CHEST: ICD-10-CM

## 2025-02-06 PROCEDURE — 3075F SYST BP GE 130 - 139MM HG: CPT | Performed by: INTERNAL MEDICINE

## 2025-02-06 PROCEDURE — 3078F DIAST BP <80 MM HG: CPT | Performed by: INTERNAL MEDICINE

## 2025-02-06 PROCEDURE — G2211 COMPLEX E/M VISIT ADD ON: HCPCS | Performed by: INTERNAL MEDICINE

## 2025-02-06 PROCEDURE — 1123F ACP DISCUSS/DSCN MKR DOCD: CPT | Performed by: INTERNAL MEDICINE

## 2025-02-06 PROCEDURE — 1159F MED LIST DOCD IN RCRD: CPT | Performed by: INTERNAL MEDICINE

## 2025-02-06 PROCEDURE — 99214 OFFICE O/P EST MOD 30 MIN: CPT | Performed by: INTERNAL MEDICINE

## 2025-02-06 NOTE — PATIENT INSTRUCTIONS
Continue with inhalers    I will let you know what shows up in the cultures    Follow up in 4 months

## 2025-02-06 NOTE — PROGRESS NOTES
Chief complaint  This is a 79 y.o. year old male  who comes to see me with a chief complaint of   Chief Complaint   Patient presents with    Follow-up    Emphysema    COPD     Was in [facility] for 6 days.        HPI  Here with a cc of abnormal CT Chest, emphysema    He had recent admission and bronchoscopy at .  Cultures showed gram negative organisms and + AFB.  TB PCR negative    Prior to going to the hospital he was doing ok.  On trelegy and albuterol/nebs.  Now using flutter valve       Current Outpatient Medications:     fluticasone-umeclidin-vilant (TRELEGY ELLIPTA) 200-62.5-25 MCG/ACT AEPB inhaler, Inhale 1 puff into the lungs daily, Disp: 1 each, Rfl: 11    fluticasone-umeclidin-vilant (TRELEGY ELLIPTA) 200-62.5-25 MCG/ACT AEPB inhaler, Inhale 1 puff into the lungs daily, Disp: 1 each, Rfl: 0    albuterol (PROVENTIL) (2.5 MG/3ML) 0.083% nebulizer solution, Take 3 mLs by nebulization every 6 hours as needed for Wheezing, Disp: 360 each, Rfl: 3    guaiFENesin (MUCINEX) 600 MG extended release tablet, Take 1 tablet by mouth 2 times daily, Disp: 30 tablet, Rfl: 0    lisinopril-hydroCHLOROthiazide (PRINZIDE;ZESTORETIC) 20-12.5 MG per tablet, Take 0.5 tablets by mouth daily, Disp: , Rfl:     methotrexate (RHEUMATREX) 2.5 MG chemo tablet, Take 6 tablets by mouth once a week On Sundays, Disp: , Rfl:     gabapentin (NEURONTIN) 100 MG capsule, Take 2 capsules by mouth 3 times daily., Disp: , Rfl:     naproxen (NAPROSYN) 500 MG tablet, Take 1 tablet by mouth 2 times daily (with meals), Disp: , Rfl:     loratadine (CLARITIN) 10 MG tablet, Take 1 tablet by mouth daily, Disp: , Rfl:     vitamin D 25 MCG (1000 UT) CAPS, Take 1 capsule by mouth daily, Disp: , Rfl:     Omega-3 Fatty Acids (FISH OIL) 1000 MG capsule, Take 2 capsules by mouth daily, Disp: , Rfl:     predniSONE (DELTASONE) 10 MG tablet, 40mg for 3days 30mg for 3days 20mg for 3days 10mg for 3days, Disp: 30 tablet, Rfl: 0    albuterol sulfate

## 2025-02-17 ENCOUNTER — APPOINTMENT (OUTPATIENT)
Dept: GENERAL RADIOLOGY | Age: 80
End: 2025-02-17
Payer: MEDICARE

## 2025-02-17 ENCOUNTER — HOSPITAL ENCOUNTER (INPATIENT)
Age: 80
LOS: 4 days | Discharge: HOME HEALTH | End: 2025-02-21
Attending: STUDENT IN AN ORGANIZED HEALTH CARE EDUCATION/TRAINING PROGRAM | Admitting: INTERNAL MEDICINE
Payer: MEDICARE

## 2025-02-17 ENCOUNTER — APPOINTMENT (OUTPATIENT)
Dept: CT IMAGING | Age: 80
End: 2025-02-17
Payer: MEDICARE

## 2025-02-17 DIAGNOSIS — R06.02 SHORTNESS OF BREATH: ICD-10-CM

## 2025-02-17 DIAGNOSIS — I48.0 PAROXYSMAL ATRIAL FIBRILLATION (HCC): ICD-10-CM

## 2025-02-17 DIAGNOSIS — R53.1 GENERALIZED WEAKNESS: Primary | ICD-10-CM

## 2025-02-17 DIAGNOSIS — J44.9 CHRONIC OBSTRUCTIVE PULMONARY DISEASE, UNSPECIFIED COPD TYPE (HCC): ICD-10-CM

## 2025-02-17 DIAGNOSIS — J98.4 CAVITARY LESION OF LUNG: ICD-10-CM

## 2025-02-17 DIAGNOSIS — N17.9 AKI (ACUTE KIDNEY INJURY): ICD-10-CM

## 2025-02-17 LAB
ALBUMIN SERPL-MCNC: 2.9 G/DL (ref 3.4–5)
ALBUMIN/GLOB SERPL: 0.7 {RATIO} (ref 1.1–2.2)
ALP SERPL-CCNC: 88 U/L (ref 40–129)
ALT SERPL-CCNC: 6 U/L (ref 10–40)
ANION GAP SERPL CALCULATED.3IONS-SCNC: 12 MMOL/L (ref 3–16)
AST SERPL-CCNC: 18 U/L (ref 15–37)
BASOPHILS # BLD: 0.1 K/UL (ref 0–0.2)
BASOPHILS NFR BLD: 0.6 %
BILIRUB SERPL-MCNC: 0.3 MG/DL (ref 0–1)
BILIRUB UR QL STRIP.AUTO: NEGATIVE
BUN SERPL-MCNC: 56 MG/DL (ref 7–20)
CALCIUM SERPL-MCNC: 8.1 MG/DL (ref 8.3–10.6)
CHLORIDE SERPL-SCNC: 97 MMOL/L (ref 99–110)
CLARITY UR: CLEAR
CO2 SERPL-SCNC: 27 MMOL/L (ref 21–32)
COLOR UR: YELLOW
CREAT SERPL-MCNC: 1.4 MG/DL (ref 0.8–1.3)
DEPRECATED RDW RBC AUTO: 21.7 % (ref 12.4–15.4)
EKG ATRIAL RATE: 79 BPM
EKG DIAGNOSIS: NORMAL
EKG P AXIS: 18 DEGREES
EKG P-R INTERVAL: 170 MS
EKG Q-T INTERVAL: 370 MS
EKG QRS DURATION: 84 MS
EKG QTC CALCULATION (BAZETT): 424 MS
EKG R AXIS: 25 DEGREES
EKG T AXIS: 38 DEGREES
EKG VENTRICULAR RATE: 79 BPM
EOSINOPHIL # BLD: 0.1 K/UL (ref 0–0.6)
EOSINOPHIL NFR BLD: 0.9 %
FLUAV RNA RESP QL NAA+PROBE: NOT DETECTED
FLUBV RNA RESP QL NAA+PROBE: NOT DETECTED
GFR SERPLBLD CREATININE-BSD FMLA CKD-EPI: 51 ML/MIN/{1.73_M2}
GLUCOSE SERPL-MCNC: 110 MG/DL (ref 70–99)
GLUCOSE UR STRIP.AUTO-MCNC: NEGATIVE MG/DL
HCT VFR BLD AUTO: 31.2 % (ref 40.5–52.5)
HGB BLD-MCNC: 10.5 G/DL (ref 13.5–17.5)
HGB UR QL STRIP.AUTO: NEGATIVE
KETONES UR STRIP.AUTO-MCNC: NEGATIVE MG/DL
LACTATE BLDV-SCNC: 1.8 MMOL/L (ref 0.4–2)
LEUKOCYTE ESTERASE UR QL STRIP.AUTO: NEGATIVE
LYMPHOCYTES # BLD: 1.6 K/UL (ref 1–5.1)
LYMPHOCYTES NFR BLD: 13.7 %
MCH RBC QN AUTO: 27.7 PG (ref 26–34)
MCHC RBC AUTO-ENTMCNC: 33.6 G/DL (ref 31–36)
MCV RBC AUTO: 82.5 FL (ref 80–100)
MONOCYTES # BLD: 0.7 K/UL (ref 0–1.3)
MONOCYTES NFR BLD: 6 %
NEUTROPHILS # BLD: 9.1 K/UL (ref 1.7–7.7)
NEUTROPHILS NFR BLD: 78.8 %
NITRITE UR QL STRIP.AUTO: NEGATIVE
PH UR STRIP.AUTO: 5.5 [PH] (ref 5–8)
PLATELET # BLD AUTO: 581 K/UL (ref 135–450)
PMV BLD AUTO: 6.3 FL (ref 5–10.5)
POTASSIUM SERPL-SCNC: 3.8 MMOL/L (ref 3.5–5.1)
PROCALCITONIN SERPL IA-MCNC: 0.36 NG/ML (ref 0–0.15)
PROCALCITONIN SERPL IA-MCNC: 0.38 NG/ML (ref 0–0.15)
PROT SERPL-MCNC: 7 G/DL (ref 6.4–8.2)
PROT UR STRIP.AUTO-MCNC: NEGATIVE MG/DL
RBC # BLD AUTO: 3.78 M/UL (ref 4.2–5.9)
RSV BY PCR: NOT DETECTED
SARS-COV-2 RNA RESP QL NAA+PROBE: NOT DETECTED
SODIUM SERPL-SCNC: 136 MMOL/L (ref 136–145)
SP GR UR STRIP.AUTO: 1.02 (ref 1–1.03)
TROPONIN, HIGH SENSITIVITY: 16 NG/L (ref 0–22)
TROPONIN, HIGH SENSITIVITY: 20 NG/L (ref 0–22)
UA COMPLETE W REFLEX CULTURE PNL UR: NORMAL
UA DIPSTICK W REFLEX MICRO PNL UR: NORMAL
URN SPEC COLLECT METH UR: NORMAL
UROBILINOGEN UR STRIP-ACNC: 0.2 E.U./DL
WBC # BLD AUTO: 11.6 K/UL (ref 4–11)

## 2025-02-17 PROCEDURE — 36415 COLL VENOUS BLD VENIPUNCTURE: CPT

## 2025-02-17 PROCEDURE — 87449 NOS EACH ORGANISM AG IA: CPT

## 2025-02-17 PROCEDURE — 6360000002 HC RX W HCPCS: Performed by: PHYSICIAN ASSISTANT

## 2025-02-17 PROCEDURE — 80053 COMPREHEN METABOLIC PANEL: CPT

## 2025-02-17 PROCEDURE — 1200000000 HC SEMI PRIVATE

## 2025-02-17 PROCEDURE — 93005 ELECTROCARDIOGRAM TRACING: CPT | Performed by: PHYSICIAN ASSISTANT

## 2025-02-17 PROCEDURE — 99223 1ST HOSP IP/OBS HIGH 75: CPT | Performed by: INTERNAL MEDICINE

## 2025-02-17 PROCEDURE — 96375 TX/PRO/DX INJ NEW DRUG ADDON: CPT

## 2025-02-17 PROCEDURE — 81003 URINALYSIS AUTO W/O SCOPE: CPT

## 2025-02-17 PROCEDURE — 71250 CT THORAX DX C-: CPT

## 2025-02-17 PROCEDURE — 93010 ELECTROCARDIOGRAM REPORT: CPT | Performed by: INTERNAL MEDICINE

## 2025-02-17 PROCEDURE — 85025 COMPLETE CBC W/AUTO DIFF WBC: CPT

## 2025-02-17 PROCEDURE — 99285 EMERGENCY DEPT VISIT HI MDM: CPT

## 2025-02-17 PROCEDURE — 87641 MR-STAPH DNA AMP PROBE: CPT

## 2025-02-17 PROCEDURE — 6370000000 HC RX 637 (ALT 250 FOR IP)

## 2025-02-17 PROCEDURE — 2580000003 HC RX 258: Performed by: PHYSICIAN ASSISTANT

## 2025-02-17 PROCEDURE — 6360000002 HC RX W HCPCS: Performed by: INTERNAL MEDICINE

## 2025-02-17 PROCEDURE — 84484 ASSAY OF TROPONIN QUANT: CPT

## 2025-02-17 PROCEDURE — 84145 PROCALCITONIN (PCT): CPT

## 2025-02-17 PROCEDURE — 71045 X-RAY EXAM CHEST 1 VIEW: CPT

## 2025-02-17 PROCEDURE — 96374 THER/PROPH/DIAG INJ IV PUSH: CPT

## 2025-02-17 PROCEDURE — 83605 ASSAY OF LACTIC ACID: CPT

## 2025-02-17 PROCEDURE — 87040 BLOOD CULTURE FOR BACTERIA: CPT

## 2025-02-17 RX ORDER — POLYETHYLENE GLYCOL 3350 17 G/17G
17 POWDER, FOR SOLUTION ORAL DAILY PRN
Status: DISCONTINUED | OUTPATIENT
Start: 2025-02-17 | End: 2025-02-21 | Stop reason: HOSPADM

## 2025-02-17 RX ORDER — SODIUM CHLORIDE 9 MG/ML
INJECTION, SOLUTION INTRAVENOUS PRN
Status: DISCONTINUED | OUTPATIENT
Start: 2025-02-17 | End: 2025-02-21 | Stop reason: HOSPADM

## 2025-02-17 RX ORDER — 0.9 % SODIUM CHLORIDE 0.9 %
1000 INTRAVENOUS SOLUTION INTRAVENOUS ONCE
Status: COMPLETED | OUTPATIENT
Start: 2025-02-17 | End: 2025-02-17

## 2025-02-17 RX ORDER — BUDESONIDE AND FORMOTEROL FUMARATE DIHYDRATE 160; 4.5 UG/1; UG/1
2 AEROSOL RESPIRATORY (INHALATION)
Status: DISCONTINUED | OUTPATIENT
Start: 2025-02-18 | End: 2025-02-21 | Stop reason: HOSPADM

## 2025-02-17 RX ORDER — SODIUM CHLORIDE 0.9 % (FLUSH) 0.9 %
5-40 SYRINGE (ML) INJECTION EVERY 12 HOURS SCHEDULED
Status: DISCONTINUED | OUTPATIENT
Start: 2025-02-17 | End: 2025-02-21 | Stop reason: HOSPADM

## 2025-02-17 RX ORDER — BENZONATATE 100 MG/1
100 CAPSULE ORAL 3 TIMES DAILY PRN
Status: DISCONTINUED | OUTPATIENT
Start: 2025-02-17 | End: 2025-02-21 | Stop reason: HOSPADM

## 2025-02-17 RX ORDER — LISINOPRIL AND HYDROCHLOROTHIAZIDE 12.5; 2 MG/1; MG/1
0.5 TABLET ORAL DAILY
Status: DISCONTINUED | OUTPATIENT
Start: 2025-02-18 | End: 2025-02-18 | Stop reason: SDUPTHER

## 2025-02-17 RX ORDER — ONDANSETRON 4 MG/1
4 TABLET, ORALLY DISINTEGRATING ORAL EVERY 8 HOURS PRN
Status: DISCONTINUED | OUTPATIENT
Start: 2025-02-17 | End: 2025-02-21 | Stop reason: HOSPADM

## 2025-02-17 RX ORDER — ASPIRIN 81 MG/1
81 TABLET ORAL DAILY
Status: DISCONTINUED | OUTPATIENT
Start: 2025-02-18 | End: 2025-02-20

## 2025-02-17 RX ORDER — VANCOMYCIN 1.75 G/350ML
1250 INJECTION, SOLUTION INTRAVENOUS ONCE
Status: COMPLETED | OUTPATIENT
Start: 2025-02-17 | End: 2025-02-17

## 2025-02-17 RX ORDER — ENOXAPARIN SODIUM 100 MG/ML
40 INJECTION SUBCUTANEOUS DAILY
Status: DISCONTINUED | OUTPATIENT
Start: 2025-02-17 | End: 2025-02-19

## 2025-02-17 RX ORDER — GABAPENTIN 100 MG/1
200 CAPSULE ORAL 3 TIMES DAILY
Status: DISCONTINUED | OUTPATIENT
Start: 2025-02-18 | End: 2025-02-21 | Stop reason: HOSPADM

## 2025-02-17 RX ORDER — ACETAMINOPHEN 325 MG/1
650 TABLET ORAL EVERY 6 HOURS PRN
Status: DISCONTINUED | OUTPATIENT
Start: 2025-02-17 | End: 2025-02-21 | Stop reason: HOSPADM

## 2025-02-17 RX ORDER — ACETAMINOPHEN 650 MG/1
650 SUPPOSITORY RECTAL EVERY 6 HOURS PRN
Status: DISCONTINUED | OUTPATIENT
Start: 2025-02-17 | End: 2025-02-21 | Stop reason: HOSPADM

## 2025-02-17 RX ORDER — ONDANSETRON 2 MG/ML
4 INJECTION INTRAMUSCULAR; INTRAVENOUS EVERY 6 HOURS PRN
Status: DISCONTINUED | OUTPATIENT
Start: 2025-02-17 | End: 2025-02-21 | Stop reason: HOSPADM

## 2025-02-17 RX ORDER — SODIUM CHLORIDE 0.9 % (FLUSH) 0.9 %
5-40 SYRINGE (ML) INJECTION PRN
Status: DISCONTINUED | OUTPATIENT
Start: 2025-02-17 | End: 2025-02-21 | Stop reason: HOSPADM

## 2025-02-17 RX ORDER — GUAIFENESIN 600 MG/1
600 TABLET, EXTENDED RELEASE ORAL 2 TIMES DAILY
Status: DISCONTINUED | OUTPATIENT
Start: 2025-02-17 | End: 2025-02-21 | Stop reason: HOSPADM

## 2025-02-17 RX ADMIN — SODIUM CHLORIDE 1000 ML: 0.9 INJECTION, SOLUTION INTRAVENOUS at 17:34

## 2025-02-17 RX ADMIN — GUAIFENESIN 600 MG: 600 TABLET, EXTENDED RELEASE ORAL at 19:51

## 2025-02-17 RX ADMIN — AZITHROMYCIN MONOHYDRATE 500 MG: 500 INJECTION, POWDER, LYOPHILIZED, FOR SOLUTION INTRAVENOUS at 12:51

## 2025-02-17 RX ADMIN — SODIUM CHLORIDE 1000 ML: 9 INJECTION, SOLUTION INTRAVENOUS at 11:38

## 2025-02-17 RX ADMIN — VANCOMYCIN 1250 MG: 1.75 INJECTION, SOLUTION INTRAVENOUS at 17:34

## 2025-02-17 RX ADMIN — CEFEPIME 2000 MG: 2 INJECTION, POWDER, FOR SOLUTION INTRAVENOUS at 12:47

## 2025-02-17 NOTE — PROGRESS NOTES
Consult has been called to Dr. calderon on 2/17/25. P.s dr calderon. 4:08 PM    Yane Jacques  2/17/2025

## 2025-02-17 NOTE — PROGRESS NOTES
ID    Consult request received, chart reviewed     79yoM with cavitary lung disease   RA on mtx    Recent admission W 1/24-1/30 w chest pain, SOB   CT chest 1/24/25  IMPRESSION:   1. No acute pulmonary embolism.   2.  Enlarging cavitary nodule in the right upper lobe measuring up to 3.1 cm, which is concerning for indolent infection or malignancy. Second irregular pleural-parenchymal cavity in the left apex with pleural thickening in region of previously noted mixed consolidation and cavitation on 2015 chest radiograph. Considerations include indolent infection as typical or atypical mycobacterial infection or superinfection of a pre-existing cavity with Aspergillus.   3.  New groundglass nodule in the right lower lobe measuring up to 1.5 cm as well as a new 8 mm solid pulmonary nodule in the right lower lobe nonspecific, recommend follow-up CT chest in 3 months.   4.  Extensive emphysematous changes with enlarging thick-walled bulla in the left upper lobe.     AFB expectorated sputum with \"rapid growing AFB,\" TB PCR neg   AFB bronch in process     Was not acutely ill and abx were withheld     Now admitted with hypotension, weakness   WBC 11, lactic <2     ID is consulted re cavitary disease, suspected pulmNTM infection     I will see him tomorrow  No isolation needs   Empiric abx to include coverage of Enterobacter isolated in BAL at , cefepime is appropriate for that     NORI WINTERS MD

## 2025-02-17 NOTE — ED PROVIDER NOTES
[facility] EMERGENCY DEPARTMENT  EMERGENCY DEPARTMENT ENCOUNTER        Patient Name: Lucio Padilla  MRN: [id number]  Birthdate 1945  Date of evaluation: 2/17/2025  Provider: Alla Torres MD  PCP: Olimpia Lu MD  Note Started: 1:38 PM EST 2/17/25    I independently examined and evaluated Lucio Padilla. I personally saw the patient and performed a substantive portion of the visit including all aspects of the medical decision making.  I made/approved the management plan and take responsibility for the patient management.  I am the primary physician of record.    CHIEF COMPLAINT  illness       HISTORY OF PRESENT ILLNESS  History from : Patient    Limitations to history : None    In brief, Lucio Padilla is a 79 y.o. male  has a past medical history of Arthritis, COPD (chronic obstructive pulmonary disease) ([facility]), Dizziness (01/24/2014), Emphysema lung ([facility]), and Hypertension., who presents to the ED complaining of illness.  Patient reporting generalized increased p.o. intake over the past couple weeks.  There is concern for thrive.  Patient was admitted recently at [facility] for respiratory complaints and had a lung biopsy that is still pending.  There is concern that he is unable to care for himself at home.  He does report productive cough and intermittent fever.  Denies chest pain or shortness of breath.  Does report some diarrhea and decreased urinary frequency.  Denies falls      REVIEW OF SYSTEMS  All systems reviewed, pertinent positives per HPI otherwise noted to be negative.    Focused exam revealed   PHYSICAL EXAM  ED Triage Vitals   BP Systolic BP Percentile Diastolic BP Percentile Temp Temp src Pulse Respirations SpO2   02/17/25 1004 -- -- 02/17/25 1004 -- 02/17/25 1004 02/17/25 1004 02/17/25 1004   101/60   97.5 °F (36.4 °C)  95 18 98 %      Height Weight - Scale         02/17/25 1145 02/17/25 1145         1.753 m (5' 9\") 54.4 kg (119 lb 14.9 oz)

## 2025-02-17 NOTE — PROGRESS NOTES
Consult has been called to Dr. forte on 2/17/25. Spoke with jaqui. 4:24 PM    Yane Jacques  2/17/2025

## 2025-02-17 NOTE — PROGRESS NOTES
4 Eyes Skin Assessment     NAME:  Lucio Padilla  YOB: 1945  MEDICAL RECORD NUMBER:  [id number]    The patient is being assessed for  Admission    I agree that at least one RN has performed a thorough Head to Toe Skin Assessment on the patient. ALL assessment sites listed below have been assessed.      Areas assessed by both nurses:    Head, Face, Ears, Shoulders, Back, Chest, Arms, Elbows, Hands, Sacrum. Buttock, Coccyx, Ischium, Legs. Feet and Heels, and Under Medical Devices     Scattered bruising     Does the Patient have a Wound? No noted wound(s)       Charly Prevention initiated by RN: No  Wound Care Orders initiated by RN: No    Pressure Injury (Stage 3,4, Unstageable, DTI, NWPT, and Complex wounds) if present, place Wound referral order by RN under : No    New Ostomies, if present place, Ostomy referral order under : No     Nurse 1 eSignature: Electronically signed by Carolee Vargas RN on 2/17/25 at 5:53 PM EST    **SHARE this note so that the co-signing nurse can place an eSignature**    Nurse 2 eSignature: Electronically signed by Emma Murillo RN on 2/17/25 at 5:58 PM EST

## 2025-02-17 NOTE — CONSULTS
[facility]                                 Pulmonary/Critical care  Consult /Progress Note :                                                                  CC :worsening SOB ,caviatry lesion   Patient is being seen at the request of Dr JASON Trinh  for a consultation for cavitary lesion     HISTORY OF PRESENT ILLNESS:   Smoke for 40 years and had 120 PPY  Cough  with productive yellow brown   Om MTX for RA  Was at [facility] and had bronch that was AFB + and enterobacter colacae  On 2 L but no home O2  Had CT shows cavitary lesion for which IO was consulted      States SOB with cough and productive cough with no fever or chills and some along with generalized weakness along with cough and  phlegm reported t          PAST MEDICAL HISTORY:  Past Medical History:   Diagnosis Date    Arthritis     COPD (chronic obstructive pulmonary disease) (HCC)     Dizziness 01/24/2014    Emphysema lung (HCC)     Hypertension      PAST SURGICAL HISTORY:  Past Surgical History:   Procedure Laterality Date    COLONOSCOPY      CT NEEDLE BIOPSY LUNG PERCUTANEOUS W IMAGING GUIDANCE  04/05/2021    CT NEEDLE BIOPSY LUNG PERCUTANEOUS 4/5/2021 [facility] CT    ENDOSCOPY, COLON, DIAGNOSTIC      EYE SURGERY      TONSILLECTOMY         FAMILY HISTORY:  family history includes Cancer in his mother; Coronary Art Dis in his father; Heart Disease in his father; Hypertension in his mother; Other in his mother.    SOCIAL HISTORY:   reports that he quit smoking about 18 years ago. His smoking use included cigarettes. He started smoking about 68 years ago. He has a 150 pack-year smoking history. He has never used smokeless tobacco.    Scheduled Meds:   sodium chloride  1,000 mL IntraVENous Once    sodium chloride flush  5-40 mL IntraVENous 2 times per day    enoxaparin  40 mg SubCUTAneous Daily    guaiFENesin  600 mg Oral BID    vancomycin  1,250 mg IntraVENous Once

## 2025-02-17 NOTE — ED PROVIDER NOTES
[facility] EMERGENCY DEPARTMENT  EMERGENCY DEPARTMENT ENCOUNTER        Pt Name: Lucio Padilla  MRN: [id number]  Birthdate 1945  Date of evaluation: 2/17/2025  Provider: Allie Becerril PA-C  PCP: Olimpia Lu MD  Note Started: 10:56 AM EST 2/17/25      RICARDO. I have evaluated this patient.  With my attending physician Dr. Torres      CHIEF COMPLAINT       Chief Complaint   Patient presents with    Illness     Pt presents to the ER with the complaint of being lethargic and low on energy x4 days. Family states pts BP has been running low. EMS was called on Friday for the pt and he refused. Pt has a productive cough. Pt was hospitalized at  for 6 days 2 weeks ago. Pt was admitted for Chest pain and TB rule out.        HISTORY OF PRESENT ILLNESS: 1 or more Elements     History From: Patient and daughter            Chief Complaint: Weakness    Lucio Padilla is a 79 y.o. male who presents complaints of generalized weakness and decreased p.o. intake for the past couple of weeks.  Daughter reports that he was hospitalized for respiratory complaints and had a lung biopsy that is pending.  He recently saw pulmonology outpatient.  He has not been improving at home and recently decided to live with his daughter due to not being able to take care of himself.  He admits to intermittent fever productive cough.  Denies chest pain shortness of breath vomiting.  He did recently have some diarrhea.  Admits to dark urine with decreased frequency.  No recent falls.  No new medications or antibiotics.    Nursing Notes were all reviewed and agreed with or any disagreements were addressed in the HPI.    REVIEW OF SYSTEMS :      Review of Systems    Positives and Pertinent negatives as per HPI.     SURGICAL HISTORY     Past Surgical History:   Procedure Laterality Date    CT NEEDLE BIOPSY LUNG PERCUTANEOUS W IMAGING GUIDANCE  4/5/2021    CT NEEDLE BIOPSY LUNG PERCUTANEOUS 4/5/2021 [facility] CT    EYE SURGERY           DISPOSITION/PLAN     DISPOSITION Decision To Admit 02/17/2025 01:15:42 PM   DISPOSITION CONDITION Stable           PATIENT REFERRED TO:  No follow-up provider specified.    DISCHARGE MEDICATIONS:  New Prescriptions    No medications on file       DISCONTINUED MEDICATIONS:  Discontinued Medications    No medications on file              (Please note that portions of this note were completed with a voice recognition program.  Efforts were made to edit the dictations but occasionally words are mis-transcribed.)    Allie Becerril PA-C (electronically signed)       Allie Becerril PA-C  02/17/25 0222

## 2025-02-17 NOTE — PLAN OF CARE
Generalized weakness.  Productive cough.  Worsening cavitary lesions, s/p recent bronch.    -admit 2W.  -HCAP coverage given recent hospitalization with vanc and cefepime.  -respiratory studies ordered.  -pulm consulted.  -id consulted.    Jessica Klein PA-C 2/17/2025 1:35 PM

## 2025-02-17 NOTE — CONSULTS
[facility]   Pharmacy Pharmacokinetic Monitoring Service - Vancomycin     Lucio Padilla is a 79 y.o. male starting on vancomycin therapy for nosocomial pneumonia x 7 days. Pharmacy consulted by Dr. Mendez for monitoring and adjustment.    Target Concentration: Goal AUC/SUSANA 400-600 mg*hr/L    Additional Antimicrobials: Cefepime    Pertinent Laboratory Values:   Wt Readings from Last 1 Encounters:   02/17/25 54.2 kg (119 lb 8 oz)     Temp Readings from Last 1 Encounters:   02/17/25 97.6 °F (36.4 °C) (Oral)     Estimated Creatinine Clearance: 32 mL/min (A) (based on SCr of 1.4 mg/dL (H)).  Recent Labs     02/17/25  1031   CREATININE 1.4*   BUN 56*   WBC 11.6*     Procalcitonin: 0.38    Pertinent Cultures:  Culture Date Source Results        MRSA Nasal Swab: not ordered. Order placed by pharmacy.    Plan:  Dosing recommendations based on Bayesian software  Start vancomycin 1250 mg x 1, then 1000 mg every 24 hours.  Anticipated AUC of 543 and trough concentration of 16.8 at steady state  Renal labs as indicated   Vancomycin concentration ordered for 2/19 @ 1430.   Pharmacy will continue to monitor patient and adjust therapy as indicated    Thank you for the consult,  Waldo Demarco RPH  2/17/2025 4:00 PM

## 2025-02-18 PROBLEM — J98.4 CAVITARY LESION OF LUNG: Status: ACTIVE | Noted: 2025-02-18

## 2025-02-18 PROBLEM — D75.839 THROMBOCYTOSIS: Status: ACTIVE | Noted: 2025-02-18

## 2025-02-18 LAB
ANION GAP SERPL CALCULATED.3IONS-SCNC: 13 MMOL/L (ref 3–16)
BASOPHILS # BLD: 0.1 K/UL (ref 0–0.2)
BASOPHILS NFR BLD: 0.4 %
BUN SERPL-MCNC: 27 MG/DL (ref 7–20)
CALCIUM SERPL-MCNC: 7.9 MG/DL (ref 8.3–10.6)
CHLORIDE SERPL-SCNC: 104 MMOL/L (ref 99–110)
CO2 SERPL-SCNC: 21 MMOL/L (ref 21–32)
CREAT SERPL-MCNC: 0.9 MG/DL (ref 0.8–1.3)
DEPRECATED RDW RBC AUTO: 21.5 % (ref 12.4–15.4)
EOSINOPHIL # BLD: 0 K/UL (ref 0–0.6)
EOSINOPHIL NFR BLD: 0.1 %
FERRITIN SERPL IA-MCNC: 264 NG/ML (ref 30–400)
GFR SERPLBLD CREATININE-BSD FMLA CKD-EPI: 86 ML/MIN/{1.73_M2}
GLUCOSE SERPL-MCNC: 90 MG/DL (ref 70–99)
HCT VFR BLD AUTO: 29.7 % (ref 40.5–52.5)
HGB BLD-MCNC: 9.6 G/DL (ref 13.5–17.5)
IRON SATN MFR SERPL: 11 % (ref 20–50)
IRON SERPL-MCNC: 23 UG/DL (ref 59–158)
LEGIONELLA AG UR QL: NORMAL
LYMPHOCYTES # BLD: 1 K/UL (ref 1–5.1)
LYMPHOCYTES NFR BLD: 7 %
MCH RBC QN AUTO: 26.6 PG (ref 26–34)
MCHC RBC AUTO-ENTMCNC: 32.2 G/DL (ref 31–36)
MCV RBC AUTO: 82.5 FL (ref 80–100)
MONOCYTES # BLD: 0.2 K/UL (ref 0–1.3)
MONOCYTES NFR BLD: 1.4 %
NEUTROPHILS # BLD: 13.3 K/UL (ref 1.7–7.7)
NEUTROPHILS NFR BLD: 91.1 %
ORGANISM: ABNORMAL
PLATELET # BLD AUTO: 635 K/UL (ref 135–450)
PMV BLD AUTO: 6.4 FL (ref 5–10.5)
POTASSIUM SERPL-SCNC: 3.8 MMOL/L (ref 3.5–5.1)
RBC # BLD AUTO: 3.59 M/UL (ref 4.2–5.9)
REPORT: NORMAL
RESP PATH DNA+RNA PNL L RESP NAA+NON-PRB: ABNORMAL
S PNEUM AG UR QL: NORMAL
SODIUM SERPL-SCNC: 138 MMOL/L (ref 136–145)
TIBC SERPL-MCNC: 204 UG/DL (ref 260–445)
WBC # BLD AUTO: 14.6 K/UL (ref 4–11)

## 2025-02-18 PROCEDURE — 6360000002 HC RX W HCPCS

## 2025-02-18 PROCEDURE — 99223 1ST HOSP IP/OBS HIGH 75: CPT | Performed by: INTERNAL MEDICINE

## 2025-02-18 PROCEDURE — 36415 COLL VENOUS BLD VENIPUNCTURE: CPT

## 2025-02-18 PROCEDURE — 87077 CULTURE AEROBIC IDENTIFY: CPT

## 2025-02-18 PROCEDURE — 83540 ASSAY OF IRON: CPT

## 2025-02-18 PROCEDURE — 99232 SBSQ HOSP IP/OBS MODERATE 35: CPT | Performed by: NURSE PRACTITIONER

## 2025-02-18 PROCEDURE — 1200000000 HC SEMI PRIVATE

## 2025-02-18 PROCEDURE — 85025 COMPLETE CBC W/AUTO DIFF WBC: CPT

## 2025-02-18 PROCEDURE — 80048 BASIC METABOLIC PNL TOTAL CA: CPT

## 2025-02-18 PROCEDURE — 87186 SC STD MICRODIL/AGAR DIL: CPT

## 2025-02-18 PROCEDURE — 6360000002 HC RX W HCPCS: Performed by: INTERNAL MEDICINE

## 2025-02-18 PROCEDURE — 2500000003 HC RX 250 WO HCPCS

## 2025-02-18 PROCEDURE — 87205 SMEAR GRAM STAIN: CPT

## 2025-02-18 PROCEDURE — 6370000000 HC RX 637 (ALT 250 FOR IP): Performed by: INTERNAL MEDICINE

## 2025-02-18 PROCEDURE — 2580000003 HC RX 258: Performed by: INTERNAL MEDICINE

## 2025-02-18 PROCEDURE — 87070 CULTURE OTHR SPECIMN AEROBIC: CPT

## 2025-02-18 PROCEDURE — 82728 ASSAY OF FERRITIN: CPT

## 2025-02-18 PROCEDURE — 87633 RESP VIRUS 12-25 TARGETS: CPT

## 2025-02-18 PROCEDURE — 6370000000 HC RX 637 (ALT 250 FOR IP)

## 2025-02-18 PROCEDURE — 99233 SBSQ HOSP IP/OBS HIGH 50: CPT | Performed by: INTERNAL MEDICINE

## 2025-02-18 PROCEDURE — 94640 AIRWAY INHALATION TREATMENT: CPT

## 2025-02-18 PROCEDURE — 83550 IRON BINDING TEST: CPT

## 2025-02-18 PROCEDURE — 94761 N-INVAS EAR/PLS OXIMETRY MLT: CPT

## 2025-02-18 RX ORDER — FOLIC ACID 1 MG/1
1 TABLET ORAL DAILY
COMMUNITY

## 2025-02-18 RX ORDER — LISINOPRIL 10 MG/1
10 TABLET ORAL DAILY
Status: DISCONTINUED | OUTPATIENT
Start: 2025-02-18 | End: 2025-02-19

## 2025-02-18 RX ORDER — PREDNISONE 10 MG/1
10 TABLET ORAL DAILY
Status: ON HOLD | COMMUNITY
End: 2025-02-21 | Stop reason: HOSPADM

## 2025-02-18 RX ORDER — HYDROCHLOROTHIAZIDE 25 MG/1
6.25 TABLET ORAL DAILY
Status: DISCONTINUED | OUTPATIENT
Start: 2025-02-18 | End: 2025-02-19

## 2025-02-18 RX ORDER — FLUTICASONE PROPIONATE AND SALMETEROL 500; 50 UG/1; UG/1
1 POWDER RESPIRATORY (INHALATION) EVERY 12 HOURS
COMMUNITY

## 2025-02-18 RX ORDER — TIOTROPIUM BROMIDE 18 UG/1
18 CAPSULE ORAL; RESPIRATORY (INHALATION) DAILY
COMMUNITY

## 2025-02-18 RX ORDER — ALENDRONATE SODIUM 70 MG/1
70 TABLET ORAL
COMMUNITY

## 2025-02-18 RX ORDER — FLUOCINONIDE 0.5 MG/G
CREAM TOPICAL 2 TIMES DAILY PRN
COMMUNITY

## 2025-02-18 RX ORDER — ALBUTEROL SULFATE 0.83 MG/ML
2.5 SOLUTION RESPIRATORY (INHALATION) EVERY 6 HOURS PRN
COMMUNITY

## 2025-02-18 RX ORDER — LANOLIN ALCOHOL/MO/W.PET/CERES
500 CREAM (GRAM) TOPICAL DAILY
COMMUNITY

## 2025-02-18 RX ORDER — PANTOPRAZOLE SODIUM 40 MG/1
40 TABLET, DELAYED RELEASE ORAL
Status: ON HOLD | COMMUNITY
End: 2025-02-21 | Stop reason: HOSPADM

## 2025-02-18 RX ORDER — ACETAMINOPHEN 500 MG
500 TABLET ORAL EVERY 6 HOURS PRN
COMMUNITY

## 2025-02-18 RX ADMIN — CEFEPIME 2000 MG: 2 INJECTION, POWDER, FOR SOLUTION INTRAVENOUS at 01:23

## 2025-02-18 RX ADMIN — GABAPENTIN 200 MG: 100 CAPSULE ORAL at 15:50

## 2025-02-18 RX ADMIN — ASPIRIN 81 MG: 81 TABLET, COATED ORAL at 09:32

## 2025-02-18 RX ADMIN — TIOTROPIUM BROMIDE INHALATION SPRAY 2 PUFF: 3.12 SPRAY, METERED RESPIRATORY (INHALATION) at 08:07

## 2025-02-18 RX ADMIN — GUAIFENESIN 600 MG: 600 TABLET, EXTENDED RELEASE ORAL at 09:32

## 2025-02-18 RX ADMIN — SODIUM CHLORIDE, PRESERVATIVE FREE 10 ML: 5 INJECTION INTRAVENOUS at 21:48

## 2025-02-18 RX ADMIN — GABAPENTIN 200 MG: 100 CAPSULE ORAL at 09:33

## 2025-02-18 RX ADMIN — CEFEPIME 2000 MG: 2 INJECTION, POWDER, FOR SOLUTION INTRAVENOUS at 15:50

## 2025-02-18 RX ADMIN — LISINOPRIL 10 MG: 10 TABLET ORAL at 09:32

## 2025-02-18 RX ADMIN — BUDESONIDE AND FORMOTEROL FUMARATE DIHYDRATE 2 PUFF: 160; 4.5 AEROSOL RESPIRATORY (INHALATION) at 19:26

## 2025-02-18 RX ADMIN — BUDESONIDE AND FORMOTEROL FUMARATE DIHYDRATE 2 PUFF: 160; 4.5 AEROSOL RESPIRATORY (INHALATION) at 08:07

## 2025-02-18 RX ADMIN — ENOXAPARIN SODIUM 40 MG: 100 INJECTION SUBCUTANEOUS at 09:33

## 2025-02-18 RX ADMIN — GUAIFENESIN 600 MG: 600 TABLET, EXTENDED RELEASE ORAL at 21:47

## 2025-02-18 RX ADMIN — GABAPENTIN 200 MG: 100 CAPSULE ORAL at 21:47

## 2025-02-18 RX ADMIN — SODIUM CHLORIDE, PRESERVATIVE FREE 10 ML: 5 INJECTION INTRAVENOUS at 09:36

## 2025-02-18 RX ADMIN — HYDROCHLOROTHIAZIDE 6.25 MG: 25 TABLET ORAL at 09:32

## 2025-02-18 NOTE — PROGRESS NOTES
Consult has been called to sydney hilgeman rn on 2/18/25. Spoke with sydney. 12:38 PM    Yane Jacques  2/18/2025

## 2025-02-18 NOTE — PROGRESS NOTES
Report received from day shift RN. Patient resting comfortably in bed. No signs of discomfort or distress. Bed is in lowest position, wheels locked, 2/2 side rails up. Bedside table and call light within reach. White board updated. Will continue to monitor patient. Babita Ferrera RN    8:31 PM  Shift assessment complete. (See findings in flowsheet). Morning med pass complete. (See MAR). VSS. Patient with no complaints at this time.

## 2025-02-18 NOTE — PROGRESS NOTES
Progress Note    Admit Date:  2/17/2025    Admitted for worsening shortness of breath and cavitary lesion  Was at  in January with work-up as well    Subjective:  Mr. Padilla stated he is feeling the same today. On room air.     Objective:   Patient Vitals for the past 4 hrs:   BP Temp Temp src Pulse Resp SpO2   02/18/25 0915 123/61 99.2 °F (37.3 °C) Oral 80 17 93 %          Intake/Output Summary (Last 24 hours) at 2/18/2025 1314  Last data filed at 2/18/2025 0434  Gross per 24 hour   Intake --   Output 950 ml   Net -950 ml       Physical Exam:  Gen: No distress. Alert. Appears chronically ill, thin pleasant elderly male   Eyes: PERRL. No sclera icterus. No conjunctival injection.   ENT: No discharge. Pharynx clear.   Neck: No JVD. Trachea midline.  Resp: No accessory muscle use. Diminished throughout. No crackles. No wheezes. No rhonchi.   CV: Regular rate. Regular rhythm. No murmur.  No rub. No edema.   Capillary Refill: Brisk,< 3 seconds   GI: Non-tender. Non-distended.  Normal bowel sounds.  Skin: Warm and dry. No nodule on exposed extremities. No rash on exposed extremities.   M/S: No cyanosis. No joint deformity. No clubbing.   Neuro: Awake. Grossly nonfocal    Psych: Oriented x 3. No anxiety or agitation.      Scheduled Meds:   lisinopril  10 mg Oral Daily    And    hydroCHLOROthiazide  6.25 mg Oral Daily    sodium chloride flush  5-40 mL IntraVENous 2 times per day    enoxaparin  40 mg SubCUTAneous Daily    guaiFENesin  600 mg Oral BID    cefepime  2,000 mg IntraVENous Q12H    aspirin  81 mg Oral Daily    budesonide-formoterol  2 puff Inhalation BID RT    And    tiotropium  2 puff Inhalation Daily RT    gabapentin  200 mg Oral TID       Continuous Infusions:   sodium chloride         PRN Meds:  sodium chloride flush, sodium chloride, ondansetron **OR** ondansetron, polyethylene glycol, acetaminophen **OR** acetaminophen, benzonatate      Data:  CBC:   Recent Labs     02/17/25  1031 02/18/25  0516   WBC  antigen, urine [[id number]] Collected: 02/17/25 1031    Order Status: Completed Specimen: Urine, clean catch Updated: 02/18/25 0745     L. pneumophila Serogp 1 Ur Ag --     Presumptive Negative  No Legionella pneumophila serogroup 1 antigens detected.  A negative result does not exclude infection with  Legionella pneumophila serogroup 1 nor does it rule out  other microbial-caused respiratory infections or  disease caused by other serogroups of  Legionella pneumophila.  Normal Range: Presumptive Negative      Narrative:      ORDER#: [id number]                          ORDERED BY: FRANKLIN CHUNG  SOURCE: Urine Clean Catch                  COLLECTED:  02/17/25 10:31  ANTIBIOTICS AT MAHNAZ.:                      RECEIVED :  02/17/25 20:38    Legionella antigen, urine [[id number]] Collected: 02/17/25 0000    Order Status: Canceled Specimen: Urine, clean catch             RADIOLOGY    CT CHEST WO CONTRAST   Final Result   Biapical cavitary lesions, left greater than right, progressed compared to   prior CT dated 01/08/2024, consistent with a worsening cavitating infectious   or inflammatory process, superimposed on emphysematous lungs.  No discrete   suspicious pulmonary nodularity.      Reactive mediastinal lymphadenopathy.         XR CHEST PORTABLE   Final Result   Chronic lung changes/scarring with new consolidation in the medial aspect of   the left upper lobe concerning for mass or pneumonia.              Assessment/Plan:    Worsening cavitary lesions  S/p bronch-   Reactive mediastinal lymphadenopathy   COPD  +Enterobacter cloacae in sputum previous admission   - pulmonary consulted  - Infectious disease consulted  - CT as above  - TB quant negative AFB TB PCR negative x2 previous admission at   - started on Vancomycin and Cefepime D# 2  - procal 0.36  - pneumonia panel pending   - MRSA nasal swab pending   - hx of igG monoclonal gammopathy - consider checking       UTE  - creatinine 1.4 on admission  - resolved

## 2025-02-18 NOTE — CARE COORDINATION
Case Management Assessment  Initial Evaluation    Date/Time of Evaluation: 2/18/2025 8:37 AM  Assessment Completed by: Babita Varner    If patient is discharged prior to next notation, then this note serves as note for discharge by case management.    Patient Name: Lucio Padilla                   YOB: 1945  Diagnosis: Generalized weakness [R53.1]  UTE (acute kidney injury) (HCC) [N17.9]  Cavitary lesion of lung [J98.4]                   Date / Time: 2/17/2025  9:55 AM    Patient Admission Status: Inpatient   Readmission Risk (Low < 19, Mod (19-27), High > 27): Readmission Risk Score: 14.3    Current PCP: Olimpia Lu MD  PCP verified by CM? Yes    Chart Reviewed: Yes      History Provided by: Patient  Patient Orientation: Alert and Oriented    Patient Cognition: Alert    Hospitalization in the last 30 days (Readmission):  No    If yes, Readmission Assessment in CM Navigator will be completed.    Advance Directives:      Code Status: Full Code   Patient's Primary Decision Maker is: Legal Next of Kin    Secondary Decision Maker: Babita Padilla - Child - [phone]    Discharge Planning:    Patient lives with: Children Type of Home: Trailer/Mobile Home  Primary Care Giver: Self  Patient Support Systems include: Children   Current Financial resources: Medicare  Current community resources: None  Current services prior to admission: Durable Medical Equipment            Current DME: Cane            Type of Home Care services:  None    ADLS  Prior functional level: Independent in ADLs/IADLs  Current functional level: Independent in ADLs/IADLs    PT AM-PAC:   /24  OT AM-PAC:   /24    Family can provide assistance at DC: Yes  Would you like Case Management to discuss the discharge plan with any other family members/significant others, and if so, who? Yes (daughter)  Plans to Return to Present Housing: Yes  Other Identified Issues/Barriers to RETURNING to current housing: none  Potential  Assistance needed at discharge: N/A            Potential DME:    Patient expects to discharge to: Trailer/mobile home  Plan for transportation at discharge:      Financial    Payor: [facility] MEDICARE / Plan: [facility] MEDICARE ADVANTAGE / Product Type: *No Product type* /     Does insurance require precert for SNF: Yes    Potential assistance Purchasing Medications: No  Meds-to-Beds request: Yes      [facility] [address] - P [phone] - F [phone]  [address] [address]  Phone: [phone] Fax: [phone]    [facility] [address] -  [phone] - F [phone]  [address] [facility] [facility] [facility] [address]  Phone: [phone] Fax: [phone]      Notes:    Factors facilitating achievement of predicted outcomes: Motivated, Cooperative, and Pleasant    Barriers to discharge: weakness    Additional Case Management Notes: Reviewed chart and met with pt at [facility]. From house with daughter, plan to return at [place]. IPTA. + . Uses cane. No HC PTA. No needs identified at this time. Pt admitted for weakness, asked MD for PT recs. CM following.       The Plan for Transition of Care is related to the following treatment goals of Generalized weakness [R53.1]  UTE (acute kidney injury) (HCC) [N17.9]  Cavitary lesion of lung [J98.4]    IF APPLICABLE: The Patient and/or patient representative Lucio and his family were provided with a choice of provider and agrees with the discharge plan. Freedom of choice list with basic dialogue that supports the patient's individualized plan of care/goals and shares the quality data associated with the providers was provided to:     Patient Representative Name:       The Patient and/or Patient Representative Agree with the Discharge Plan?      Babita Varner  Case Management Department  Ph: [phone] Fax: [phone]

## 2025-02-18 NOTE — PROGRESS NOTES
[facility]                                 Pulmonary/Critical care  Consult /Progress Note :                                                                  CC :worsening SOB ,caviatry lesion     HISTORY OF PRESENT ILLNESS:   Doing better  Mild cough  No CP  Yellow sputum    AFB + and enterobacter colacae 2 weeks ago      PHYSICAL EXAM:  Vitals:    02/18/25 0434   BP: 129/65   Pulse: 93   Resp: 18   Temp: 98.9 °F (37.2 °C)   SpO2: 92%     Gen: No distress.   Eyes: PERRL. No sclera icterus. No conjunctival injection.   ENT: No discharge. Pharynx clear.   Neck: Trachea midline. No obvious mass.    Resp: Rhonchi   CV: Regular rate. Regular rhythm. No murmur or rub. No edema. Peripheral pulses are 2+.  Capillary refill is less than 3 seconds.  GI: Non-tender. Non-distended. No hernia.   Skin: Warm and dry. No nodule on exposed extremities.   Lymph: No cervical LAD. No supraclavicular LAD.   M/S: No cyanosis. No joint deformity. No clubbing.   Neuro: Awake. Alert. Moves all four extremities.   Psych: Oriented x 3. No anxiety.     LABS:  CBC:   Recent Labs     02/17/25  1031 02/18/25  0516   WBC 11.6* 14.6*   HGB 10.5* 9.6*   HCT 31.2* 29.7*   MCV 82.5 82.5   * 635*     BMP:   Recent Labs     02/17/25  1031 02/18/25  0516    138   K 3.8 3.8   CL 97* 104   CO2 27 21   BUN 56* 27*   CREATININE 1.4* 0.9     LIVER PROFILE:   Recent Labs     02/17/25  1031   AST 18   ALT 6*   BILITOT 0.3   ALKPHOS 88     PT/INR: No results for input(s): \"PROTIME\", \"INR\" in the last 72 hours.  APTT: No results for input(s): \"APTT\" in the last 72 hours.  UA:  Recent Labs     02/17/25  1031   COLORU Yellow   PHUR 5.5   CLARITYU Clear   LEUKOCYTESUR Negative   UROBILINOGEN 0.2   BILIRUBINUR Negative   BLOODU Negative   GLUCOSEU Negative       Microbiology:Imaging:  From    AFB   E colacae complex on 1/30   Pneumonia panel 2/17  rganism

## 2025-02-18 NOTE — H&P
Hospital Medicine History & Physical      PCP: Olimpia Lu MD    Date of Admission: 2/17/2025    Date of Service: Pt seen/examined on 2/17/2025    Pt seen/examined face to face on and admitted as inpatient with expected LOS to be two days but can change depending on diagnostic work up and treatment response.     Chief Complaint:    Chief Complaint   Patient presents with    Illness     Pt presents to the ER with the complaint of being lethargic and low on energy x4 days. Family states pts BP has been running low. EMS was called on Friday for the pt and he refused. Pt has a productive cough. Pt was hospitalized at  for 6 days 2 weeks ago. Pt was admitted for Chest pain and TB rule out.             ASSESSMENT AND PLAN:    Active Hospital Problems    Diagnosis Date Noted    Generalized weakness [R53.1] 02/17/2025     Generalized weakness:  PT OT    Worsening cavitary lesions status post recent bronchoscopy  Pulmonary consulted, appreciated  Infectious is consulted, appreciated  Rapid growing AFB TB PCR negative  Continue cefepime for now    Essential Hypertension: Reviewed patient's medications and plan is to continue home medication      .Due to the above diagnosis makes the patient higher risk for morbidity and mortality requiring testing and treatment      Discussion with the primary ER physician in regards to symptoms, history, physical exam, diagnosis and treatment, collaborative decision was to admit the patient.    Diet: NPO except meds ordered past midnight    DVT Prophylaxis: lovenox    Dispo:   Expected LOS of two days      History Of Present Illness:      79 y.o. male who presented to [facility] with past medical history of COPD, emphysema, hypertension presented ED with chief complaint of generalized weakness cough phlegm reported that he was recently at  hospitalized for 2 weeks and was discharged after she got discharged she was feeling fine and then suddenly all of a sudden he got

## 2025-02-18 NOTE — FLOWSHEET NOTE
02/18/25 0915   Vital Signs   Temp 99.2 °F (37.3 °C)   Temp Source Oral   Pulse 80   Heart Rate Source Monitor   Respirations 17   /61   MAP (Calculated) 82   BP Location Left upper arm   BP Method Automatic   Patient Position Semi fowlers   Opioid-Induced Sedation   POSS Score 1   Oxygen Therapy   SpO2 93 %   O2 Device None (Room air)     AM assessment completed. No complaints of pain or discomfort voiced. No signs or symptoms of distress noted. Patient tolerated AM medications well. Respirations easy and even. Bed in lowest position,  bed rails x2 up,  Call light within reach.     Bedside Mobility Assessment Tool (BMAT):     Assessment Level 1- Sit and Shake    1. From a semi-reclined position, ask patient to sit up and rotate to a seated position at the side of the bed. Can use the bedrail.    2. Ask patient to reach out and grab your hand and shake making sure patient reaches across his/her midline.   Pass- Patient is able to come to a seated position, maintain core strength. Maintains seated balance while reaching across midline. Move on to Assessment Level 2.     Assessment Level 2- Stretch and Point   1. With patient in seated position at the side of the bed, have patient place both feet on the floor (or stool) with knees no higher than hips.    2. Ask patient to stretch one leg and straighten the knee, then bend the ankle/flex and point the toes. If appropriate, repeat with the other leg.   Pass- Patient is able to demonstrate appropriate quad strength on intended weight bearing limb(s). Move onto Assessment Level 3.     Assessment Level 3- Stand   1. Ask patient to elevate off the bed or chair (seated to standing) using an assistive device (cane, bedrail).    2. Patient should be able to raise buttocks off be and hold for a count of five. May repeat once.   Pass- Patient maintains standing stability for at least 5 seconds, proceed to assessment level 4.    Assessment Level 4- Walk   1. Ask patient

## 2025-02-19 PROBLEM — N17.9 AKI (ACUTE KIDNEY INJURY): Status: ACTIVE | Noted: 2025-02-19

## 2025-02-19 PROBLEM — I48.0 PAROXYSMAL ATRIAL FIBRILLATION (HCC): Status: ACTIVE | Noted: 2025-02-19

## 2025-02-19 PROBLEM — E43 SEVERE PROTEIN-CALORIE MALNUTRITION: Status: ACTIVE | Noted: 2025-02-19

## 2025-02-19 PROBLEM — R06.02 SHORTNESS OF BREATH: Status: ACTIVE | Noted: 2025-02-19

## 2025-02-19 LAB
ANION GAP SERPL CALCULATED.3IONS-SCNC: 9 MMOL/L (ref 3–16)
BASOPHILS # BLD: 0.1 K/UL (ref 0–0.2)
BASOPHILS NFR BLD: 0.6 %
BUN SERPL-MCNC: 17 MG/DL (ref 7–20)
CALCIUM SERPL-MCNC: 7.8 MG/DL (ref 8.3–10.6)
CHLORIDE SERPL-SCNC: 101 MMOL/L (ref 99–110)
CO2 SERPL-SCNC: 26 MMOL/L (ref 21–32)
CREAT SERPL-MCNC: 0.8 MG/DL (ref 0.8–1.3)
DEPRECATED RDW RBC AUTO: 21.5 % (ref 12.4–15.4)
EKG ATRIAL RATE: 119 BPM
EKG DIAGNOSIS: NORMAL
EKG Q-T INTERVAL: 306 MS
EKG QRS DURATION: 88 MS
EKG QTC CALCULATION (BAZETT): 439 MS
EKG R AXIS: 43 DEGREES
EKG T AXIS: 68 DEGREES
EKG VENTRICULAR RATE: 124 BPM
EOSINOPHIL # BLD: 0.2 K/UL (ref 0–0.6)
EOSINOPHIL NFR BLD: 1.7 %
FOLATE SERPL-MCNC: 6.73 NG/ML (ref 4.78–24.2)
GFR SERPLBLD CREATININE-BSD FMLA CKD-EPI: 89 ML/MIN/{1.73_M2}
GLUCOSE SERPL-MCNC: 101 MG/DL (ref 70–99)
HCT VFR BLD AUTO: 27.8 % (ref 40.5–52.5)
HGB BLD-MCNC: 9.1 G/DL (ref 13.5–17.5)
LYMPHOCYTES # BLD: 1.3 K/UL (ref 1–5.1)
LYMPHOCYTES NFR BLD: 13.4 %
MAGNESIUM SERPL-MCNC: 1.56 MG/DL (ref 1.8–2.4)
MCH RBC QN AUTO: 26.9 PG (ref 26–34)
MCHC RBC AUTO-ENTMCNC: 32.9 G/DL (ref 31–36)
MCV RBC AUTO: 81.9 FL (ref 80–100)
MONOCYTES # BLD: 0.2 K/UL (ref 0–1.3)
MONOCYTES NFR BLD: 2.4 %
MRSA DNA SPEC QL NAA+PROBE: NORMAL
NEUTROPHILS # BLD: 7.9 K/UL (ref 1.7–7.7)
NEUTROPHILS NFR BLD: 81.9 %
PLATELET # BLD AUTO: 540 K/UL (ref 135–450)
PMV BLD AUTO: 6.5 FL (ref 5–10.5)
POTASSIUM SERPL-SCNC: 3.5 MMOL/L (ref 3.5–5.1)
RBC # BLD AUTO: 3.39 M/UL (ref 4.2–5.9)
SODIUM SERPL-SCNC: 136 MMOL/L (ref 136–145)
TSH SERPL DL<=0.005 MIU/L-ACNC: 1.41 UIU/ML (ref 0.27–4.2)
VIT B12 SERPL-MCNC: 1280 PG/ML (ref 211–911)
WBC # BLD AUTO: 9.7 K/UL (ref 4–11)

## 2025-02-19 PROCEDURE — 99233 SBSQ HOSP IP/OBS HIGH 50: CPT | Performed by: INTERNAL MEDICINE

## 2025-02-19 PROCEDURE — 84443 ASSAY THYROID STIM HORMONE: CPT

## 2025-02-19 PROCEDURE — 6370000000 HC RX 637 (ALT 250 FOR IP): Performed by: INTERNAL MEDICINE

## 2025-02-19 PROCEDURE — 83735 ASSAY OF MAGNESIUM: CPT

## 2025-02-19 PROCEDURE — 1200000000 HC SEMI PRIVATE

## 2025-02-19 PROCEDURE — 97161 PT EVAL LOW COMPLEX 20 MIN: CPT

## 2025-02-19 PROCEDURE — 36415 COLL VENOUS BLD VENIPUNCTURE: CPT

## 2025-02-19 PROCEDURE — 6360000002 HC RX W HCPCS: Performed by: INTERNAL MEDICINE

## 2025-02-19 PROCEDURE — 82607 VITAMIN B-12: CPT

## 2025-02-19 PROCEDURE — 2700000000 HC OXYGEN THERAPY PER DAY

## 2025-02-19 PROCEDURE — 99223 1ST HOSP IP/OBS HIGH 75: CPT | Performed by: INTERNAL MEDICINE

## 2025-02-19 PROCEDURE — 94761 N-INVAS EAR/PLS OXIMETRY MLT: CPT

## 2025-02-19 PROCEDURE — 80048 BASIC METABOLIC PNL TOTAL CA: CPT

## 2025-02-19 PROCEDURE — 97530 THERAPEUTIC ACTIVITIES: CPT

## 2025-02-19 PROCEDURE — 94640 AIRWAY INHALATION TREATMENT: CPT

## 2025-02-19 PROCEDURE — 97535 SELF CARE MNGMENT TRAINING: CPT

## 2025-02-19 PROCEDURE — 2500000003 HC RX 250 WO HCPCS

## 2025-02-19 PROCEDURE — 82746 ASSAY OF FOLIC ACID SERUM: CPT

## 2025-02-19 PROCEDURE — 93010 ELECTROCARDIOGRAM REPORT: CPT | Performed by: INTERNAL MEDICINE

## 2025-02-19 PROCEDURE — 93005 ELECTROCARDIOGRAM TRACING: CPT | Performed by: INTERNAL MEDICINE

## 2025-02-19 PROCEDURE — 2500000003 HC RX 250 WO HCPCS: Performed by: NURSE PRACTITIONER

## 2025-02-19 PROCEDURE — 99232 SBSQ HOSP IP/OBS MODERATE 35: CPT | Performed by: INTERNAL MEDICINE

## 2025-02-19 PROCEDURE — 2580000003 HC RX 258: Performed by: INTERNAL MEDICINE

## 2025-02-19 PROCEDURE — 85025 COMPLETE CBC W/AUTO DIFF WBC: CPT

## 2025-02-19 PROCEDURE — 6370000000 HC RX 637 (ALT 250 FOR IP)

## 2025-02-19 PROCEDURE — 6360000002 HC RX W HCPCS

## 2025-02-19 RX ORDER — POTASSIUM CHLORIDE 7.45 MG/ML
10 INJECTION INTRAVENOUS PRN
Status: DISCONTINUED | OUTPATIENT
Start: 2025-02-19 | End: 2025-02-21 | Stop reason: HOSPADM

## 2025-02-19 RX ORDER — LISINOPRIL 5 MG/1
5 TABLET ORAL DAILY
Status: DISCONTINUED | OUTPATIENT
Start: 2025-02-20 | End: 2025-02-21 | Stop reason: HOSPADM

## 2025-02-19 RX ORDER — ENOXAPARIN SODIUM 100 MG/ML
1 INJECTION SUBCUTANEOUS DAILY
Status: DISCONTINUED | OUTPATIENT
Start: 2025-02-20 | End: 2025-02-19 | Stop reason: DRUGHIGH

## 2025-02-19 RX ORDER — ENOXAPARIN SODIUM 100 MG/ML
1 INJECTION SUBCUTANEOUS 2 TIMES DAILY
Status: DISCONTINUED | OUTPATIENT
Start: 2025-02-19 | End: 2025-02-20

## 2025-02-19 RX ORDER — METOPROLOL TARTRATE 1 MG/ML
2.5 INJECTION, SOLUTION INTRAVENOUS
Status: DISCONTINUED | OUTPATIENT
Start: 2025-02-19 | End: 2025-02-21 | Stop reason: HOSPADM

## 2025-02-19 RX ORDER — METOPROLOL SUCCINATE 25 MG/1
25 TABLET, EXTENDED RELEASE ORAL DAILY
Status: DISCONTINUED | OUTPATIENT
Start: 2025-02-19 | End: 2025-02-21 | Stop reason: HOSPADM

## 2025-02-19 RX ORDER — POTASSIUM CHLORIDE 1500 MG/1
40 TABLET, EXTENDED RELEASE ORAL PRN
Status: DISCONTINUED | OUTPATIENT
Start: 2025-02-19 | End: 2025-02-21 | Stop reason: HOSPADM

## 2025-02-19 RX ORDER — MAGNESIUM SULFATE IN WATER 40 MG/ML
4000 INJECTION, SOLUTION INTRAVENOUS ONCE
Status: COMPLETED | OUTPATIENT
Start: 2025-02-19 | End: 2025-02-19

## 2025-02-19 RX ORDER — MAGNESIUM SULFATE IN WATER 40 MG/ML
2000 INJECTION, SOLUTION INTRAVENOUS PRN
Status: DISCONTINUED | OUTPATIENT
Start: 2025-02-19 | End: 2025-02-21 | Stop reason: HOSPADM

## 2025-02-19 RX ORDER — POTASSIUM CHLORIDE 1500 MG/1
20 TABLET, EXTENDED RELEASE ORAL ONCE
Status: COMPLETED | OUTPATIENT
Start: 2025-02-19 | End: 2025-02-19

## 2025-02-19 RX ADMIN — GABAPENTIN 200 MG: 100 CAPSULE ORAL at 20:05

## 2025-02-19 RX ADMIN — BUDESONIDE AND FORMOTEROL FUMARATE DIHYDRATE 2 PUFF: 160; 4.5 AEROSOL RESPIRATORY (INHALATION) at 07:44

## 2025-02-19 RX ADMIN — TIOTROPIUM BROMIDE INHALATION SPRAY 2 PUFF: 3.12 SPRAY, METERED RESPIRATORY (INHALATION) at 07:44

## 2025-02-19 RX ADMIN — CEFEPIME 2000 MG: 2 INJECTION, POWDER, FOR SOLUTION INTRAVENOUS at 01:03

## 2025-02-19 RX ADMIN — ACETAMINOPHEN 650 MG: 325 TABLET ORAL at 20:05

## 2025-02-19 RX ADMIN — SODIUM CHLORIDE, PRESERVATIVE FREE 10 ML: 5 INJECTION INTRAVENOUS at 09:20

## 2025-02-19 RX ADMIN — GABAPENTIN 200 MG: 100 CAPSULE ORAL at 09:21

## 2025-02-19 RX ADMIN — BUDESONIDE AND FORMOTEROL FUMARATE DIHYDRATE 2 PUFF: 160; 4.5 AEROSOL RESPIRATORY (INHALATION) at 19:15

## 2025-02-19 RX ADMIN — MAGNESIUM SULFATE HEPTAHYDRATE 4000 MG: 40 INJECTION, SOLUTION INTRAVENOUS at 09:20

## 2025-02-19 RX ADMIN — SODIUM CHLORIDE, PRESERVATIVE FREE 10 ML: 5 INJECTION INTRAVENOUS at 20:06

## 2025-02-19 RX ADMIN — GUAIFENESIN 600 MG: 600 TABLET, EXTENDED RELEASE ORAL at 09:20

## 2025-02-19 RX ADMIN — ASPIRIN 81 MG: 81 TABLET, COATED ORAL at 09:21

## 2025-02-19 RX ADMIN — ENOXAPARIN SODIUM 50 MG: 100 INJECTION SUBCUTANEOUS at 20:05

## 2025-02-19 RX ADMIN — ENOXAPARIN SODIUM 40 MG: 100 INJECTION SUBCUTANEOUS at 09:20

## 2025-02-19 RX ADMIN — CEFEPIME 2000 MG: 2 INJECTION, POWDER, FOR SOLUTION INTRAVENOUS at 12:38

## 2025-02-19 RX ADMIN — GABAPENTIN 200 MG: 100 CAPSULE ORAL at 12:39

## 2025-02-19 RX ADMIN — POTASSIUM CHLORIDE 20 MEQ: 1500 TABLET, EXTENDED RELEASE ORAL at 09:20

## 2025-02-19 RX ADMIN — METOPROLOL SUCCINATE 25 MG: 25 TABLET, FILM COATED, EXTENDED RELEASE ORAL at 17:10

## 2025-02-19 RX ADMIN — GUAIFENESIN 600 MG: 600 TABLET, EXTENDED RELEASE ORAL at 20:05

## 2025-02-19 RX ADMIN — METOPROLOL TARTRATE 2.5 MG: 5 INJECTION, SOLUTION INTRAVENOUS at 21:26

## 2025-02-19 ASSESSMENT — ENCOUNTER SYMPTOMS
ABDOMINAL DISTENTION: 0
EYE REDNESS: 0
SINUS PRESSURE: 0
COUGH: 1
VOMITING: 0
DIARRHEA: 0
SHORTNESS OF BREATH: 0
SINUS PAIN: 0
CONSTIPATION: 0
EYE PAIN: 0
NAUSEA: 0
STRIDOR: 0
ABDOMINAL PAIN: 0

## 2025-02-19 ASSESSMENT — PAIN SCALES - GENERAL
PAINLEVEL_OUTOF10: 0

## 2025-02-19 NOTE — ACP (ADVANCE CARE PLANNING)
[facility]  Palliative Medicine Consultation Note      Date Of Admission:2/17/2025  Date of consult: 02/19/25  Seen by PC in the past: No    Recommendations:      Introduced palliative care and had a voluntary discussion about advance care planning. Palliative care consultation ordered for a goals of care discussion. Patient reports that he is feeling much better compared to when he came into the hospital. Patient has been working with PT/OT while admitted at [facility]. States that he has been able to walk around his room with no difficulty. Patient denies need to go to SNF for therapy but states he is agreeable for  for skilled therapy. Discussed which agency he would want and he says he will let his daughter make that decision. Discussed [facility] and patients states that he is fairly independent and does not feel like that is a service he needs at this time.     1. Goals of Care/Advanced Care planning/Code status: Full  2. Pain: Denies  3. SOB: Denies  4. Disposition: Home with Home Health    Reason for Consult:         [x]  Goals of Care  []  Code Status Discussion/Advanced Care Planning   []  Psychosocial/Family Support  []  Symptom Management  []  Other (Specify)    Requesting Physician: Dr. Retana    CHIEF COMPLAINT:  Generalized weakness    History Obtained From:  patient, EMR    History of Present Illness:         Lucio Padilla is a 79 y.o. male with PMH of COPD, emphysema, hypertension  who presented to [facility] ED with generalized weakness and productive cough.  Patient was recently hospitalized for 2 weeks at . States he was feeling better when discharged and then suddenly had worsening shortness of breath and productive cough. Patient came to ED for further evaluation and was admitted to [facility]. Reports feeling much better today. Denies fever, chills, shortness of breath.     Subjective:         Past Medical History:        Diagnosis Date    Arthritis     COPD (chronic obstructive

## 2025-02-19 NOTE — PROGRESS NOTES
Pts heart rate is irregular, jumping from 90s to 120s.. Pt unsure if he has a hx of A fib. Perfect serve sent to Dr Mendez. Awaiting further orders.

## 2025-02-19 NOTE — PROGRESS NOTES
EKG showed new A fib w/ RVR. Pt placed on remote telemetry and continuous pulse ox. Pt in NSR per CMU. Dr Mendez aware. Awaiting further orders.

## 2025-02-19 NOTE — PROGRESS NOTES
Comprehensive Nutrition Assessment    Type and Reason for Visit:  Initial, Consult (Wt loss)    Nutrition Recommendations/Plan:   Continue Regular 2 gr Na  Added Ensure Plus TID Chocolate     Malnutrition Assessment:  Malnutrition Status:  Severe malnutrition (02/19/25 1806)    Context:  Acute Illness     Findings of the 6 clinical characteristics of malnutrition:  Energy Intake:  50% or less of estimated energy requirements for 5 or more days  Weight Loss:  Greater than 5% over 1 month     Body Fat Loss:  Moderate body fat loss Buccal region, Fat Overlying Ribs, Orbital, Triceps   Muscle Mass Loss:  Moderate muscle mass loss Temples (temporalis), Clavicles (pectoralis & deltoids), Scapula (trapezius)  Fluid Accumulation:  Unable to assess     Strength:  Not Performed    Nutrition Assessment:     Pt. severely malnourished AEB admitted with > 5% wt loss in < 30 days , sever muscle and fat loss; intakes < 50% > 5 days .  At risk for further nutritional compromise r/t  COPD: Lung Cancer and altered nutrition related labs .  Will continue current diet and added ensure plus TID .     Nutrition Related Findings:    pt is a & O x4; reports minimal intakes for 2 weeks PTA and UBW was 127#; Low Mg, Deena, Ca, low H & H Wound Type: None       Current Nutrition Intake & Therapies:    Average Meal Intake: 1-25%  Average Supplements Intake: None Ordered  ADULT DIET; Regular; Low Sodium (2 gm)    Anthropometric Measures:  Height: 175.9 cm (5' 9.25\")  Ideal Body Weight (IBW): 162 lbs (74 kg)    Admission Body Weight: 54 kg (119 lb)  Current Body Weight: 54 kg (119 lb), 73.5 % IBW. Weight Source: Other  Current BMI (kg/m2): 17.4  Usual Body Weight: 57.6 kg (127 lb)     % Weight Change (Calculated): -6.3                    BMI Categories: Underweight (BMI less than 22) age over 65    Estimated Daily Nutrient Needs:  Energy Requirements Based On: Kcal/kg  Weight Used for Energy Requirements: Current  Energy (kcal/day): 4015-7712  based ~ 30-33 kcal/kg cbw  Weight Used for Protein Requirements: Current  Protein (g/day): 65-76 based ~ 1.2-1.4 gr/kg cbw  Method Used for Fluid Requirements: 1 ml/kcal  Fluid (ml/day): 8575-8586    Nutrition Diagnosis:   Severe malnutrition related to catabolic illness, inadequate protein-energy intake as evidenced by criteria as identified in malnutrition assessment, lab values, BMI, poor intake prior to admission, intake 0-25%    Nutrition Interventions:   Food and/or Nutrient Delivery: Continue Current Diet, Start Oral Nutrition Supplement  Nutrition Education/Counseling: No recommendation at this time  Coordination of Nutrition Care: Continue to monitor while inpatient       Goals:  Goals: PO intake 50% or greater, by next RD assessment  Type of Goal: New goal  Previous Goal Met: New Goal    Nutrition Monitoring and Evaluation:   Behavioral-Environmental Outcomes: None Identified  Food/Nutrient Intake Outcomes: Food and Nutrient Intake, Supplement Intake  Physical Signs/Symptoms Outcomes: Biochemical Data, Nutrition Focused Physical Findings, Weight    Discharge Planning:    No discharge needs at this time     Zuleyma Richards RD, LD  Contact: [phone]

## 2025-02-19 NOTE — FLOWSHEET NOTE
02/18/25 2135   Vital Signs   Temp 99.1 °F (37.3 °C)   Temp Source Oral   Pulse 67   Heart Rate Source Monitor   Respirations 16   BP (!) 102/41   MAP (Calculated) 61   BP Location Left upper arm   BP Method Automatic   Patient Position Semi emmettwlers   Pain Assessment   Pain Assessment None - Denies Pain   Care Plan - Pain Goals   Verbalizes/displays adequate comfort level or baseline comfort level Encourage patient to monitor pain and request assistance   Opioid-Induced Sedation   POSS Score 1   RASS   Maria Agitation Sedation Scale (RASS) 0   Oxygen Therapy   SpO2 91 %   Pulse Oximetry Type Intermittent   Pulse Oximeter Device Mode Intermittent   Pulse Oximeter Device Location Right;Finger   O2 Device None (Room air)   O2 Flow Rate (L/min) 0 L/min   Oxygen Therapy None (Room air)     Pt resting comfortably in bed. Bed alarm on, call light within reach. No needs expressed at this time. Will continue to monitor.

## 2025-02-19 NOTE — PLAN OF CARE
Problem: Discharge Planning  Goal: Discharge to home or other facility with appropriate resources  Outcome: Progressing     Problem: Safety - Adult  Goal: Free from fall injury  Outcome: Progressing     Problem: Pain  Goal: Verbalizes/displays adequate comfort level or baseline comfort level  Outcome: Progressing  Flowsheets  Taken 2/19/2025 1242  Verbalizes/displays adequate comfort level or baseline comfort level:   Encourage patient to monitor pain and request assistance   Assess pain using appropriate pain scale  Taken 2/19/2025 0906  Verbalizes/displays adequate comfort level or baseline comfort level:   Encourage patient to monitor pain and request assistance   Assess pain using appropriate pain scale     Problem: Skin/Tissue Integrity  Goal: Skin integrity remains intact  Description: 1.  Monitor for areas of redness and/or skin breakdown  2.  Assess vascular access sites hourly  3.  Every 4-6 hours minimum:  Change oxygen saturation probe site  4.  Every 4-6 hours:  If on nasal continuous positive airway pressure, respiratory therapy assess nares and determine need for appliance change or resting period  Outcome: Progressing  Flowsheets (Taken 2/19/2025 1434)  Skin Integrity Remains Intact: Monitor for areas of redness and/or skin breakdown

## 2025-02-19 NOTE — PROGRESS NOTES
Shift assessment complete. Pt A/OX4. Pt denies pain or N/V/D. Scheduled meds given. See MAR. Pt denies further needs at this time. Call light within reach. Bed alarm on.

## 2025-02-19 NOTE — FLOWSHEET NOTE
02/19/25 0205   Vital Signs   Temp 99.3 °F (37.4 °C)   Temp Source Oral   Pulse 79   Heart Rate Source Monitor   Respirations 16   BP (!) 100/50   MAP (Calculated) 67   BP Location Right upper arm   BP Method Automatic   Patient Position Semi fowlers   Pain Assessment   Pain Assessment None - Denies Pain   Oxygen Therapy   SpO2 90 %   Pulse Oximetry Type Intermittent   Pulse Oximeter Device Mode Intermittent   Pulse Oximeter Device Location Right;Finger   O2 Device None (Room air)   O2 Flow Rate (L/min) 0 L/min   Oxygen Therapy None (Room air)

## 2025-02-19 NOTE — PROGRESS NOTES
Per OT, patients HR ranging from 70s-160s during therapy session. O2 sats dropped to 88% on RA while ambulating but patient recovered quickly, O2 WNL on RA at rest after recovering for a minute. Dr Mendez aware. STAT EKG ordered.

## 2025-02-19 NOTE — PROGRESS NOTES
[facility]                                 Pulmonary/Critical care  Consult /Progress Note :                                                                  CC :worsening SOB ,caviatry lesion     HISTORY OF PRESENT ILLNESS:   Doing better  Mild cough  No CP  Yellow sputum    AFB + and enterobacter colacae 2 weeks ago      PHYSICAL EXAM:  Vitals:    02/19/25 0748   BP:    Pulse:    Resp:    Temp:    SpO2: 92%     Gen: No distress.   Eyes: PERRL. No sclera icterus. No conjunctival injection.   ENT: No discharge. Pharynx clear.   Neck: Trachea midline. No obvious mass.    Resp: Rhonchi   CV: Regular rate. Regular rhythm. No murmur or rub. No edema. Peripheral pulses are 2+.  Capillary refill is less than 3 seconds.  GI: Non-tender. Non-distended. No hernia.   Skin: Warm and dry. No nodule on exposed extremities.   Lymph: No cervical LAD. No supraclavicular LAD.   M/S: No cyanosis. No joint deformity. No clubbing.   Neuro: Awake. Alert. Moves all four extremities.   Psych: Oriented x 3. No anxiety.     LABS:  CBC:   Recent Labs     02/17/25  1031 02/18/25  0516 02/19/25  0527   WBC 11.6* 14.6* 9.7   HGB 10.5* 9.6* 9.1*   HCT 31.2* 29.7* 27.8*   MCV 82.5 82.5 81.9   * 635* 540*     BMP:   Recent Labs     02/17/25  1031 02/18/25  0516 02/19/25  0527    138 136   K 3.8 3.8 3.5   CL 97* 104 101   CO2 27 21 26   BUN 56* 27* 17   CREATININE 1.4* 0.9 0.8     LIVER PROFILE:   Recent Labs     02/17/25  1031   AST 18   ALT 6*   BILITOT 0.3   ALKPHOS 88     PT/INR: No results for input(s): \"PROTIME\", \"INR\" in the last 72 hours.  APTT: No results for input(s): \"APTT\" in the last 72 hours.  UA:  Recent Labs     02/17/25  1031   COLORU Yellow   PHUR 5.5   CLARITYU Clear   LEUKOCYTESUR Negative   UROBILINOGEN 0.2   BILIRUBINUR Negative   BLOODU Negative   GLUCOSEU Negative       Microbiology:Imaging:  From    AFB   E colacae

## 2025-02-19 NOTE — CONSULTS
CARDIOLOGY CONSULTATION        Patient Name: Lucio Padilla  Date of admission: 2/17/2025  9:55 AM  Admission Dx: Generalized weakness [R53.1]  UTE (acute kidney injury) [N17.9]  Cavitary lesion of lung [J98.4]  Requesting Physician: Norma Retana DO  Primary Care physician: Olimpia Lu MD    Reason for Consultation/Chief Complaint: Paroxysmal atrial fibrillation    History of Present Illness:     Lucio Padilla is a 79 y.o. 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    Patient's course has been complicated by acute kidney injury and now development of paroxysmal atrial fibrillation.  EKG today shows atrial fibrillation with RVR, heart rate 124 bpm.  Previous EKG 2/17 showed sinus rhythm with baseline artifact.  TSH within normal limits.  Magnesium 1.5, potassium 3.5, and creatinine recovered to 0.8. Electrolytes were replaced today.     Patient evaluated today. He notes he's doing ok. He notes dyspnea with exertion that recovers on rest. Progressive over prior several weeks. Note prior admission at [facility]. He continues to cough up dark yellow sputum. No hemoptysis. No fevers. Denies chest pain. Denies palpitations or uncomfortable feelin this AM at time of atrial arrhythmia. He felt his heart \"moving\". No dizziness. No swelling.   Former smoker.     Past Medical History:   has a past medical history of Arthritis, COPD (chronic obstructive pulmonary disease) (HCC), Dizziness, Emphysema lung (HCC), and Hypertension.    Surgical History:   has a past surgical history that includes eye surgery; Tonsillectomy; CT NEEDLE BIOPSY LUNG PERCUTANEOUS (04/05/2021); Colonoscopy; and Endoscopy, colon, diagnostic.     Social  ELLIPTA) 200-62.5-25 MCG/ACT AEPB inhaler Inhale 1 puff into the lungs daily 7/8/24  Yes Karl Salinas DO   guaiFENesin (MUCINEX) 600 MG extended release tablet Take 1 tablet by mouth 2 times daily 1/9/24  Yes Elbert Monet MD   lisinopril-hydroCHLOROthiazide (PRINZIDE;ZESTORETIC) 20-12.5 MG per tablet Take 0.5 tablets by mouth daily   Yes Sayra Piña MD   methotrexate (RHEUMATREX) 2.5 MG chemo tablet Take 6 tablets by mouth once a week On Sundays   Yes Sayra Piña MD   gabapentin (NEURONTIN) 100 MG capsule Take 2 capsules by mouth 3 times daily.   Yes Sayra Piña MD   naproxen (NAPROSYN) 500 MG tablet Take 1 tablet by mouth 2 times daily (with meals)   Yes Sayra Piña MD   loratadine (CLARITIN) 10 MG tablet Take 1 tablet by mouth daily   Yes Sayra Piña MD   vitamin D 25 MCG (1000 UT) CAPS Take 1 capsule by mouth daily   Yes Sayra Piña MD   Omega-3 Fatty Acids (FISH OIL) 1000 MG capsule Take 2 capsules by mouth daily   Yes Sayra Piña MD   albuterol sulfate HFA (PROAIR HFA) 108 (90 Base) MCG/ACT inhaler Inhale 2 puffs into the lungs every 4 hours as needed for Wheezing or Shortness of Breath 4/24/23  Yes Karl Salinas DO   aspirin 81 MG tablet Take 1 tablet by mouth daily   Yes Sayra Piña MD   alendronate (FOSAMAX) 70 MG tablet Take 1 tablet by mouth every 7 days  Patient not taking: Reported on 2/18/2025    Sayra Piña MD   pantoprazole (PROTONIX) 40 MG tablet Take 1 tablet by mouth every morning (before breakfast)  Patient not taking: Reported on 2/18/2025    Sayra Piña MD        CURRENT Medications:  potassium chloride (KLOR-CON M) extended release tablet 40 mEq, PRN   Or  potassium bicarb-citric acid (EFFER-K) effervescent tablet 40 mEq, PRN   Or  potassium chloride 10 mEq/100 mL IVPB (Peripheral Line), PRN  magnesium sulfate 2000 mg in 50 mL IVPB premix, PRN  lisinopril

## 2025-02-19 NOTE — PROGRESS NOTES
ID    Patient seen and examined  Full note to follow    79yoM with cavitary NTM lung infection, anorexia  and wasting   Situation c/b loss of his wife in 4/2024 and [facility]     Multiple sputum and BAL AFB cultures collected during admission at  in 1/2025 positive with neg MTB PCR and negative stains    I spoke w micro lab today at  requesting ID and sensi, likely to be completed within a few days     Admitted with weakness, hypotension   Says he has been \"laying around\" with very little po intake     Favor dehydration and anorexia as cause of presenting issues rather than acute or progression of chronic infection   No urgency to start empiric NTM therapy, will aim to wait on culture      I am concerned about his ability to tolerate combination NTM therapy for a typical course of 15-18 months or longer  There is potential for worsening anorexia, nausea, liver and renal toxicity on top of his current frail, cachectic status     There is ambivalence exhibited - he says both \"I am tired and ready to stop,\" and also that he wants to try to get better     Above d/w patient and dtr in detail     Will consult [facility] and RD     Will follow     NORI WINTERS MD

## 2025-02-19 NOTE — PROGRESS NOTES
Inpatient Physical Therapy Evaluation & Treatment    Unit: [facility]  Date:  2/19/2025  Patient Name:    Lucio Padilla  Admitting diagnosis:  Generalized weakness [R53.1]  UTE (acute kidney injury) [N17.9]  Cavitary lesion of lung [J98.4]  Admit Date:  2/17/2025  Precautions/Restrictions/WB Status/ Lines/ Wounds/ Oxygen: Fall risk, Bed/chair alarm, Lines (IV), Telemetry, Continuous pulse oximetry, and Isolation Precautions: Droplet    Treatment Time:  13:17-14:00  Treatment Number:  1   Timed Code Treatment Minutes: 33 minutes  Total Treatment Minutes:  43  minutes    Patient Stated Goals for Therapy: \" get back to bed, I'm tired \"          Discharge Recommendations: Home with initial 24 hr supervision  DME needs for discharge: Needs Met       Therapy recommendation for EMS Transport: can transport by wheelchair    Therapy recommendations for staff:   Stand by assist for ambulation with use of No AD within room    History of Present Illness:   Admitted for worsening shortness of breath, worsening weakness productive cough decreased appetite for the past 2 weeks since a recent hospitalization. CT chest: cavitary lesion   PMHx: COPD hypertension rheumatoid arthritis     Preadmission Environment:   Pt. Lives                                              with family (daughter). Previously lived alone but is planning to move to daughter's trailer at [place].   Home environment:                            mobile home/trailer  Steps to enter first floor:                     4 steps to enter with HR  Laundry:                                              1st floor  Bathroom:                                           tub/shower unit  Pt sleeps in a:                                     Flat bed; plans to sleep in a lift chair at [facility].  [place]ipment [place]                              [facility]     Preadmission Status:  Pt. Able to drive:                                 Yes  Pt. Fully independent with ADLs:       Yes  Pt. Required    SBA  Stand to sit:   SBA  Bed to/from Chair:  Not Tested   Comments:     Gait gait completed as indicated below  Distance:      35 ft  Deviations (firm surface/linoleum):  decreased delia  Assistive Device Used:    No AD  Level of Assist:    SBA   Comment: distance self-limited by generalized fatigue    Stair Training  deferred    Therapeutic Exercises Initiated  deferred secondary to treatment focus on functional mobility  Encouraged regular UE/LE ROM while seated in chair and in bed; pt acknowledged understanding.    Positioning Needs   Pt in bed  Call light provided and all needs within reach  Pad alarm placed under bottom -  bed alarm alarming false.    Other Activities  Setup + Min A to don/doff gown.    Patient/Family Education   Pt educated on role of inpatient PT, POC, importance of continued activity, calling for assist with mobility.    Assessment  Pt seen today for physical therapy Evaluation & Treatment. Pt demonstrated decreased Activity tolerance. HR consistently 120-130 while sitting upright unsupported and during short bout of ambulation. No gross unsteadiness during transfers and ambulation.      Recommending Home with initial 24/7 sup upon discharge as patient functioning below baseline level.    Goals :   To be met in 3 visits:  1). Independent with LE Ex x 10 reps  2). Sit to/from stand: Supervision  3). Bed to chair: Supervision    To be met in 6 visits:  1).  Supine to/from sit: Modified Independent  2).  Sit to/from stand: Independent  3).  Bed to chair: Independent  4).  Gait: Ambulate  100 ft.   with Supervision and use of No AD  5).  Tolerate B LE exercises 3 sets of 10-15 reps  6).  Ascend/descend 4 steps with SBA with use of hand rail unilateral and No AD  7).  Ascend/descend curb step with SBA with use of no handrail and No AD    Rehabilitation Potential: Good  Strengths for achieving goals include:   PLOF, Family Support, and Pt cooperative   Barriers to achieving goals include:

## 2025-02-19 NOTE — PROGRESS NOTES
IM Progress Note    Admit Date:  2/17/2025    Admitted for worsening shortness of breath and cavitary lesion  Was at  in January with work-up as well    Patient seen by pulmonology and ID.   Respiratory cultures growing haemophilus influenza, Enterobacter and Moraxella.    Patient with rhythm issues on telemetry today - episode of paroxysmal A-fib- converted back to normal sinus rhythm.  Telemetry with PACs now.   Hypomagnesemia and hypokalemia being repleted      Subjective:  Mr. Padilla .  He appears weak and fatigued but in no distress .  Vital signs are stable  Denies any chest pains or shortness of breath  No fever .  she is on room air       Objective:   Patient Vitals for the past 4 hrs:   BP Temp Temp src Pulse Resp SpO2   02/19/25 1242 121/69 99.3 °F (37.4 °C) Oral (!) 102 20 92 %          Intake/Output Summary (Last 24 hours) at 2/19/2025 1438  Last data filed at 2/19/2025 1310  Gross per 24 hour   Intake 450.69 ml   Output 800 ml   Net -349.31 ml       Physical Exam:  Gen: No distress. Alert. Appears chronically ill, thin pleasant elderly male   Eyes: PERRL. No sclera icterus. No conjunctival injection.   ENT: No discharge. Pharynx clear.   Neck: No JVD. Trachea midline.  Resp: No accessory muscle use. Diminished throughout. No crackles. No wheezes. No rhonchi.   CV: Regular rate. Regular rhythm. No murmur.  No rub. No edema.   Capillary Refill: Brisk,< 3 seconds   GI: Non-tender. Non-distended.  Normal bowel sounds.  Skin: Warm and dry. No nodule on exposed extremities. No rash on exposed extremities.   M/S: No cyanosis. No joint deformity. No clubbing.   Neuro: Awake. Grossly nonfocal    Psych: Oriented x 3. No anxiety or agitation.      Scheduled Meds:   lisinopril  10 mg Oral Daily    And    hydroCHLOROthiazide  6.25 mg Oral Daily    sodium chloride flush  5-40 mL IntraVENous 2 times per day    enoxaparin  40 mg SubCUTAneous Daily    guaiFENesin  600 mg Oral BID    cefepime  2,000 mg IntraVENous  RECEIVED :  02/18/25 10:44    Culture, Respiratory (with Gram Stain) [[id number]] Collected: 02/18/25 1038    Order Status: Completed Specimen: Sputum Expectorated Updated: 02/19/25 1149     CULTURE, RESPIRATORY Further report to follow     Gram Stain Result 4+ WBC's  2+ Epithelial Cells  4+ Gram positive cocci      Narrative:      ORDER#: [id number]                          ORDERED BY: RASHMI THOMPSON  SOURCE: Sputum Expectorated                COLLECTED:  02/18/25 10:38  ANTIBIOTICS AT [facility]HNAZ.:                      RECEIVED :  02/18/25 10:44    PNEUMONIA PANEL FILM ARRAY REPORT [[id number]] Collected: 02/18/25 1038    Order Status: Completed Updated: 02/18/25 2006     Report SEE IMAGE    MRSA DNA Probe, Nasal [[id number]] Collected: 02/17/25 1721    Order Status: Completed Specimen: Nares Updated: 02/19/25 1413     MRSA SCREEN RT-PCR Further report to follow    Narrative:      ORDER#: [id number]                          ORDERED BY: FRANKLIN CHUNG  SOURCE: Nares                              COLLECTED:  02/17/25 17:21  ANTIBIOTICS AT [facility].:                      RECEIVED :  02/17/25 17:45    Culture, Respiratory [[id number]]     Order Status: Sent Specimen: Sputum Expectorated     Culture, Blood 1 [[id number]] Collected: 02/17/25 1144    Order Status: Completed Specimen: Blood Updated: 02/18/25 1215     Blood Culture, Routine No Growth to date.  Any change in status will be called.    Narrative:      ORDER#: [id number]                          ORDERED BY: RUTHANN BUENO  SOURCE: Blood Antecubital-Lef              COLLECTED:  02/17/25 11:44  ANTIBIOTICS AT [facility].:                      RECEIVED :  02/17/25 11:44  If child <=2 yrs old please draw pediatric bottle.~Blood Culture 1    Culture, Blood 2 [[id number]] Collected: 02/17/25 1140    Order Status: Completed Specimen: Blood Updated: 02/18/25 1215     Culture, Blood 2 No Growth to date.  Any change in status will be called.    Narrative:      ORDER#: [id number]

## 2025-02-19 NOTE — PLAN OF CARE
Problem: Discharge Planning  Goal: Discharge to home or other facility with appropriate resources  2/18/2025 2355 by Heaven Mcintyre RN  Outcome: Progressing  Flowsheets (Taken 2/18/2025 2135)  Discharge to home or other facility with appropriate resources: Identify barriers to discharge with patient and caregiver  2/18/2025 1759 by Carolee Vargas RN  Outcome: Progressing     Problem: Safety - Adult  Goal: Free from fall injury  2/18/2025 2355 by Heaven Mcintyre RN  Outcome: Progressing  2/18/2025 1759 by Carolee Vargas RN  Outcome: Progressing     Problem: Pain  Goal: Verbalizes/displays adequate comfort level or baseline comfort level  2/18/2025 2355 by Heaven Mcintyre RN  Outcome: Progressing  Flowsheets (Taken 2/18/2025 2135)  Verbalizes/displays adequate comfort level or baseline comfort level: Encourage patient to monitor pain and request assistance  2/18/2025 1759 by Carolee Vargas RN  Outcome: Progressing     Problem: Skin/Tissue Integrity  Goal: Skin integrity remains intact  Description: 1.  Monitor for areas of redness and/or skin breakdown  2.  Assess vascular access sites hourly  3.  Every 4-6 hours minimum:  Change oxygen saturation probe site  4.  Every 4-6 hours:  If on nasal continuous positive airway pressure, respiratory therapy assess nares and determine need for appliance change or resting period  Outcome: Progressing  Flowsheets (Taken 2/18/2025 2135)  Skin Integrity Remains Intact: Monitor for areas of redness and/or skin breakdown

## 2025-02-19 NOTE — PROGRESS NOTES
Inpatient Occupational Therapy Evaluation & Treatment    Unit: [facility]  Date:  2/19/2025  Patient Name:    Lucio Padilla  Admitting diagnosis:  Generalized weakness [R53.1]  UTE (acute kidney injury) [N17.9]  Cavitary lesion of lung [J98.4]  Admit Date:  2/17/2025  Precautions/Restrictions/WB Status/ Lines/ Wounds/ Oxygen: Fall risk, Bed/chair alarm, Lines (IV), and Isolation Precautions: Droplet    Treatment Time:  9:05-9:45  Treatment Number:  1  Timed Code Treatment Minutes: 30 minutes  Total Treatment Minutes:  40  minutes    Patient Goals for Therapy: \"to go home\"          Discharge Recommendations: Home with initial 24/7 supervision and Home OT  DME needs for discharge:  will continue to assess       Therapy recommendations for staff:   Stand by assist for ambulation with use of IV pole within room    History of Present Illness: 79 y.o. male who presented to [facility] with past medical history of COPD, emphysema, hypertension presented ED with chief complaint of generalized weakness cough phlegm.     cavitary NTM lung infection, anorexia  and wasting situation c/b loss of his wife in 4/2024 and [facility]     Preadmission Environment:   Pt. Lives     with family (daughter). Previously lived alone but is planning to move to daughter's trailer at [place].   Home environment:    mobile home/trailer  Steps to enter first floor:   4 steps to enter with HR  Laundry:     1st floor  Bathroom:     tub/shower unit  Pt sleeps in a:    Flat bed  Equipment owned:    [facility] [place]    Preadmission Status:  Pt. Able to drive:    Yes  Pt. Fully independent with ADLs:  Yes  Pt. Required assistance for:   Independent PTA  Pt. independent for functional transfers and utilized No Device for mobility in home and No Device out in community  History of falls:   No  Home Health Services:  None    AM-PAC Score: AM-PAC Inpatient Daily Activity Raw Score: 20     Subjective:  Patient lying reclined in bed with no family present.   Pt agreeable to

## 2025-02-19 NOTE — CONSULTS
Infectious Diseases   Consult Note      Reason for Consult:  Cavitary pna    Requesting Physician:  Latoya       Date of Admission: 2/17/2025    Subjective:   CHIEF COMPLAINT:  none given       HPI:    Lcuio Padilla is an 79yoM with history of HTN, COPD, former smoker  RA on low dose prednisone 5mg, and weekly mtx     He sees Pulm Brad Ventura for COPD management and surveillance of pulmonary nodules.  Last routine screening CT chest in 1/2024 showed stable nodules.      He lost his wife in 4/2024 and has struggled since then.    He has been staying with his daughter on the weekends, though they have decided he will now live with her full time.    He has been losing weight, very poor po intake, intermittent fever to 102, a few times a month, resolves without intervention.                  He was admitted to [facility] 1/24-1/30/25 with presenting complaint elevated BP, CRAWFORD with palpitations.    Initial evaluation included CTPA showing enlarging RUL cavitary nodule measuring 3.1cm, ANDREA cavity, GGO RLL.  He was admitted for evaluation.    Expectorated AFB sputum on 1/24 and 1/25, and bronch AFB culture were all positive for a rapidly growing AFB with negative smear and negative TB PCR.    ID service was involved and discharged him off of abx.    His daughter came to pick him on 2/14 per their routine, found him to be very weak.  He reported very minimal po intake through the week.  BP was low.  He came to ED 2/17 for evaluation.   No significant fever since admission.  He says he is feeling better today.    Diarrhea denied.  No hemoptysis.      ID is consulted for evaluation.        Current abx:  Cefepime 2g q12       Past Surgical History:       Diagnosis Date    Arthritis     COPD (chronic obstructive pulmonary disease) (HCC)     Dizziness 01/24/2014    Emphysema lung (HCC)     Hypertension          Procedure Laterality Date    COLONOSCOPY      CT NEEDLE BIOPSY LUNG PERCUTANEOUS W IMAGING GUIDANCE  04/05/2021    CT

## 2025-02-19 NOTE — CARE COORDINATION
Reviewed chart, discussed in round and with PT. Plan is to DC home when stable. Pt went into Afib RVR. Pt is agreeable to HC at DC. CM following.

## 2025-02-19 NOTE — PROGRESS NOTES
Infectious Disease Follow up Notes    CC :  cavitary NTM infection      Antibiotics:  Cefepime 2g q12      Admit Date:   2/17/2025  Hospital Day: 3    Subjective:   Tm 99.3  On RA   He says he is feeling better today.  Ate breakfast.  No acute changes, no hemoptysis     Objective:     Patient Vitals for the past 8 hrs:   BP Temp Temp src Pulse Resp SpO2   02/19/25 1242 121/69 99.3 °F (37.4 °C) Oral (!) 102 20 92 %   02/19/25 0906 (!) 113/55 98.3 °F (36.8 °C) Oral (!) 123 18 94 %   02/19/25 0748 -- -- -- -- -- 92 %       EXAM:  General:   alert, conversant, NAD   Frail elderly male     HEENT:   NCAT, PERRL, sclera anicteric    NECK:   Supple   LUNGS:   diminished; no W/R/R   CV:   RRR  ABD: Soft, flat, NT    EXT: No focal rash       LINE:    PIV in place        Scheduled Meds:   lisinopril  10 mg Oral Daily    And    hydroCHLOROthiazide  6.25 mg Oral Daily    sodium chloride flush  5-40 mL IntraVENous 2 times per day    enoxaparin  40 mg SubCUTAneous Daily    guaiFENesin  600 mg Oral BID    cefepime  2,000 mg IntraVENous Q12H    aspirin  81 mg Oral Daily    budesonide-formoterol  2 puff Inhalation BID RT    And    tiotropium  2 puff Inhalation Daily RT    gabapentin  200 mg Oral TID       Continuous Infusions:   sodium chloride            Data Review:    Lab Results   Component Value Date    WBC 9.7 02/19/2025    HGB 9.1 (L) 02/19/2025    HCT 27.8 (L) 02/19/2025    MCV 81.9 02/19/2025     (H) 02/19/2025     Lab Results   Component Value Date    CREATININE 0.8 02/19/2025    BUN 17 02/19/2025     02/19/2025    K 3.5 02/19/2025     02/19/2025    CO2 26 02/19/2025       Hepatic Function Panel:   Lab Results   Component Value Date/Time    ALKPHOS 88 02/17/2025 10:31 AM    ALT 6 02/17/2025 10:31 AM    AST 18 02/17/2025 10:31 AM    BILITOT 0.3 02/17/2025 10:31 AM       Cultures:   1/24     Expectorated sputum x2 +AFB, smear

## 2025-02-19 NOTE — PROGRESS NOTES
CMU notified writer that patient is having a lot of PACs, Atrial bigeminy, HR up to 135 BPM. Dr Mendez made aware.

## 2025-02-20 PROBLEM — A31.0 NONTUBERCULOUS MYCOBACTERIAL DISEASE OF LUNG (HCC): Status: ACTIVE | Noted: 2025-02-20

## 2025-02-20 LAB
ANION GAP SERPL CALCULATED.3IONS-SCNC: 10 MMOL/L (ref 3–16)
BASOPHILS # BLD: 0.1 K/UL (ref 0–0.2)
BASOPHILS NFR BLD: 0.8 %
BUN SERPL-MCNC: 19 MG/DL (ref 7–20)
CALCIUM SERPL-MCNC: 7.8 MG/DL (ref 8.3–10.6)
CHLORIDE SERPL-SCNC: 103 MMOL/L (ref 99–110)
CO2 SERPL-SCNC: 24 MMOL/L (ref 21–32)
CREAT SERPL-MCNC: 0.7 MG/DL (ref 0.8–1.3)
DEPRECATED RDW RBC AUTO: 21.5 % (ref 12.4–15.4)
EKG ATRIAL RATE: 163 BPM
EKG DIAGNOSIS: NORMAL
EKG Q-T INTERVAL: 308 MS
EKG QRS DURATION: 88 MS
EKG QTC CALCULATION (BAZETT): 442 MS
EKG R AXIS: 49 DEGREES
EKG T AXIS: 61 DEGREES
EKG VENTRICULAR RATE: 124 BPM
EOSINOPHIL # BLD: 0.2 K/UL (ref 0–0.6)
EOSINOPHIL NFR BLD: 1.4 %
GFR SERPLBLD CREATININE-BSD FMLA CKD-EPI: >90 ML/MIN/{1.73_M2}
GLUCOSE SERPL-MCNC: 109 MG/DL (ref 70–99)
HCT VFR BLD AUTO: 29.5 % (ref 40.5–52.5)
HGB BLD-MCNC: 9.7 G/DL (ref 13.5–17.5)
LYMPHOCYTES # BLD: 1.5 K/UL (ref 1–5.1)
LYMPHOCYTES NFR BLD: 12.9 %
MAGNESIUM SERPL-MCNC: 2.17 MG/DL (ref 1.8–2.4)
MCH RBC QN AUTO: 27.4 PG (ref 26–34)
MCHC RBC AUTO-ENTMCNC: 32.9 G/DL (ref 31–36)
MCV RBC AUTO: 83.1 FL (ref 80–100)
MONOCYTES # BLD: 0.4 K/UL (ref 0–1.3)
MONOCYTES NFR BLD: 3.9 %
NEUTROPHILS # BLD: 9.1 K/UL (ref 1.7–7.7)
NEUTROPHILS NFR BLD: 81 %
PHOSPHATE SERPL-MCNC: 3.1 MG/DL (ref 2.5–4.9)
PLATELET # BLD AUTO: 538 K/UL (ref 135–450)
PMV BLD AUTO: 6.4 FL (ref 5–10.5)
POTASSIUM SERPL-SCNC: 3.8 MMOL/L (ref 3.5–5.1)
RBC # BLD AUTO: 3.55 M/UL (ref 4.2–5.9)
SODIUM SERPL-SCNC: 137 MMOL/L (ref 136–145)
WBC # BLD AUTO: 11.3 K/UL (ref 4–11)

## 2025-02-20 PROCEDURE — 83735 ASSAY OF MAGNESIUM: CPT

## 2025-02-20 PROCEDURE — 1200000000 HC SEMI PRIVATE

## 2025-02-20 PROCEDURE — 80048 BASIC METABOLIC PNL TOTAL CA: CPT

## 2025-02-20 PROCEDURE — 36415 COLL VENOUS BLD VENIPUNCTURE: CPT

## 2025-02-20 PROCEDURE — 6370000000 HC RX 637 (ALT 250 FOR IP): Performed by: INTERNAL MEDICINE

## 2025-02-20 PROCEDURE — 99232 SBSQ HOSP IP/OBS MODERATE 35: CPT | Performed by: INTERNAL MEDICINE

## 2025-02-20 PROCEDURE — 85025 COMPLETE CBC W/AUTO DIFF WBC: CPT

## 2025-02-20 PROCEDURE — 6360000002 HC RX W HCPCS: Performed by: INTERNAL MEDICINE

## 2025-02-20 PROCEDURE — 93010 ELECTROCARDIOGRAM REPORT: CPT | Performed by: INTERNAL MEDICINE

## 2025-02-20 PROCEDURE — 99233 SBSQ HOSP IP/OBS HIGH 50: CPT | Performed by: INTERNAL MEDICINE

## 2025-02-20 PROCEDURE — 6370000000 HC RX 637 (ALT 250 FOR IP)

## 2025-02-20 PROCEDURE — 84100 ASSAY OF PHOSPHORUS: CPT

## 2025-02-20 PROCEDURE — 2500000003 HC RX 250 WO HCPCS

## 2025-02-20 PROCEDURE — 94640 AIRWAY INHALATION TREATMENT: CPT

## 2025-02-20 PROCEDURE — 2700000000 HC OXYGEN THERAPY PER DAY

## 2025-02-20 PROCEDURE — 2580000003 HC RX 258: Performed by: INTERNAL MEDICINE

## 2025-02-20 PROCEDURE — 94761 N-INVAS EAR/PLS OXIMETRY MLT: CPT

## 2025-02-20 RX ORDER — AMIODARONE HYDROCHLORIDE 200 MG/1
400 TABLET ORAL 2 TIMES DAILY
Status: DISCONTINUED | OUTPATIENT
Start: 2025-02-20 | End: 2025-02-21 | Stop reason: HOSPADM

## 2025-02-20 RX ORDER — AMIODARONE HYDROCHLORIDE 200 MG/1
400 TABLET ORAL 2 TIMES DAILY
Status: DISCONTINUED | OUTPATIENT
Start: 2025-02-20 | End: 2025-02-20 | Stop reason: SDUPTHER

## 2025-02-20 RX ADMIN — ENOXAPARIN SODIUM 50 MG: 100 INJECTION SUBCUTANEOUS at 09:07

## 2025-02-20 RX ADMIN — GUAIFENESIN 600 MG: 600 TABLET, EXTENDED RELEASE ORAL at 09:09

## 2025-02-20 RX ADMIN — BUDESONIDE AND FORMOTEROL FUMARATE DIHYDRATE 2 PUFF: 160; 4.5 AEROSOL RESPIRATORY (INHALATION) at 21:36

## 2025-02-20 RX ADMIN — AMOXICILLIN AND CLAVULANATE POTASSIUM 1 TABLET: 875; 125 TABLET, FILM COATED ORAL at 12:55

## 2025-02-20 RX ADMIN — CEFEPIME 2000 MG: 2 INJECTION, POWDER, FOR SOLUTION INTRAVENOUS at 01:30

## 2025-02-20 RX ADMIN — AMIODARONE HYDROCHLORIDE 400 MG: 200 TABLET ORAL at 20:39

## 2025-02-20 RX ADMIN — GABAPENTIN 200 MG: 100 CAPSULE ORAL at 12:55

## 2025-02-20 RX ADMIN — GUAIFENESIN 600 MG: 600 TABLET, EXTENDED RELEASE ORAL at 20:39

## 2025-02-20 RX ADMIN — APIXABAN 2.5 MG: 5 TABLET, FILM COATED ORAL at 20:39

## 2025-02-20 RX ADMIN — AMOXICILLIN AND CLAVULANATE POTASSIUM 1 TABLET: 875; 125 TABLET, FILM COATED ORAL at 20:39

## 2025-02-20 RX ADMIN — SODIUM CHLORIDE, PRESERVATIVE FREE 10 ML: 5 INJECTION INTRAVENOUS at 20:40

## 2025-02-20 RX ADMIN — METOPROLOL SUCCINATE 25 MG: 25 TABLET, FILM COATED, EXTENDED RELEASE ORAL at 09:09

## 2025-02-20 RX ADMIN — TIOTROPIUM BROMIDE INHALATION SPRAY 2 PUFF: 3.12 SPRAY, METERED RESPIRATORY (INHALATION) at 07:34

## 2025-02-20 RX ADMIN — ASPIRIN 81 MG: 81 TABLET, COATED ORAL at 09:09

## 2025-02-20 RX ADMIN — AMIODARONE HYDROCHLORIDE 400 MG: 200 TABLET ORAL at 10:57

## 2025-02-20 RX ADMIN — GABAPENTIN 200 MG: 100 CAPSULE ORAL at 09:09

## 2025-02-20 RX ADMIN — BUDESONIDE AND FORMOTEROL FUMARATE DIHYDRATE 2 PUFF: 160; 4.5 AEROSOL RESPIRATORY (INHALATION) at 07:34

## 2025-02-20 RX ADMIN — GABAPENTIN 200 MG: 100 CAPSULE ORAL at 20:39

## 2025-02-20 ASSESSMENT — PAIN SCALES - GENERAL
PAINLEVEL_OUTOF10: 0

## 2025-02-20 NOTE — PROGRESS NOTES
CARDIOLOGY PROGRESS NOTE        Patient Name: Lucio Padilla  Date of admission: 2/17/2025  9:55 AM  Admission Dx: Generalized weakness [R53.1]  UTE (acute kidney injury) [N17.9]  Cavitary lesion of lung [J98.4]  Requesting Physician: Norma Retana DO  Primary Care physician: Olimpia Lu MD    Reason for Consultation/Chief Complaint: Paroxysmal atrial fibrillation    History of Present Illness:     Lucio Padilla is a 79 y.o. 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    Patient's course has been complicated by acute kidney injury and now development of paroxysmal atrial fibrillation.  EKG today shows atrial fibrillation with RVR, heart rate 124 bpm.  Previous EKG 2/17 showed sinus rhythm with baseline artifact.  TSH within normal limits.  Magnesium 1.5, potassium 3.5, and creatinine recovered to 0.8. Electrolytes were replaced today.     Patient evaluated 2/19.  Noted struggling with dyspnea on exertion and sputum production over time.  No hemoptysis.  No palpitations despite new onset atrial fibrillation this admission.  Noted sinus at time of my evaluation.  Started on metoprolol XL once daily and Lovenox was made therapeutic dosing until clarification of procedures at which time we would then start Eliquis.    Per documentation this morning the patient went back in atrial fibrillation and was administered 2.5 mg IV metoprolol and subsequently converted to sinus rhythm with PACs.  No fever documented.  1 L oxygen therapy.  Magnesium/potassium corrected.  He continues to feel no palpitations. On review of telemetry, pAF/AT with rates briefly as high as 160s. He felt no palpitations or chest pain during this time.

## 2025-02-20 NOTE — PROGRESS NOTES
Infectious Disease Follow up Notes    CC :  cavitary NTM infection      Antibiotics:  Cefepime 2g q12      Admit Date:   2/17/2025  Hospital Day: 4    Subjective:   Tm 100.9  On RA   Issues with atrial tachycardia noted and d/w Dr. Hernandez   He is trying to eat more, doesn't like the food, drinking supplements  Cough is unchanged, no hemoptysis     Objective:     Patient Vitals for the past 8 hrs:   BP Temp Temp src Pulse Resp SpO2   02/20/25 1039 108/67 98.6 °F (37 °C) Oral (!) 103 18 93 %   02/20/25 0903 (!) 118/58 97.9 °F (36.6 °C) Oral 85 18 94 %   02/20/25 0735 -- -- -- -- 18 97 %   02/20/25 0400 104/67 97.5 °F (36.4 °C) Oral 92 -- 99 %       EXAM:  General:   alert, conversant, NAD   Frail elderly male     HEENT:   NCAT, PERRL, sclera anicteric    NECK:   Supple   LUNGS:   diminished; no W/R/R   CV:   RRR  ABD: Soft, flat, NT    EXT: No focal rash       LINE:    PIV in place        Scheduled Meds:   amiodarone  400 mg Oral BID    apixaban  2.5 mg Oral BID    amiodarone  400 mg Oral BID    metoprolol succinate  25 mg Oral Daily    [Held by provider] lisinopril  5 mg Oral Daily    sodium chloride flush  5-40 mL IntraVENous 2 times per day    guaiFENesin  600 mg Oral BID    cefepime  2,000 mg IntraVENous Q12H    budesonide-formoterol  2 puff Inhalation BID RT    And    tiotropium  2 puff Inhalation Daily RT    gabapentin  200 mg Oral TID       Continuous Infusions:   sodium chloride            Data Review:    Lab Results   Component Value Date    WBC 11.3 (H) 02/20/2025    HGB 9.7 (L) 02/20/2025    HCT 29.5 (L) 02/20/2025    MCV 83.1 02/20/2025     (H) 02/20/2025     Lab Results   Component Value Date    CREATININE 0.7 (L) 02/20/2025    BUN 19 02/20/2025     02/20/2025    K 3.8 02/20/2025     02/20/2025    CO2 24 02/20/2025       Hepatic Function Panel:   Lab Results   Component Value Date/Time    ALKPHOS 88  pending      Ok to hold off on starting empiric therapy  No airborne isolation indicated   We discussed in general terms the medical management of pulmonary NTM infections - typically combination abx for at least 1 year after sputum cultures convert to negative, often 15-18 months or more.    IV abx therapy may or may not be necessary.  There is significant potential for adverse effects from the treatment, including anorexia, nausea, hepatic and renal toxicity.  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  Unclear if these reflect colonization or an acute lower respiratory tract infection  Currently covered w cefepime      NKDA     Plan:     No plans to initiate NTM therapy at this point until sensitivity is completed, as long as there are no acute changes     He can f/u with me in 2 weeks or so to continue the discussion   He has my card    For possible acute pneumonia, can change to po Augmentin, avoiding fluoroquinolones and macrolides since those agents might be part of an NTM regimen and we would want to avoid inadvertent monotherapy at this point and potential emergence of resistance     I think the intermittent fever more likely reflects the chronic infection, he says he has had intermittent fever for months.  If clinical picture worsens, can regroup on the abx plan  Otherwise, little more to add from ID standpoint  Will follow peripherally       Discussed with patient/family, all questions answered        Narda Valencia MD  Phone: [phone]   Fax : [phone]

## 2025-02-20 NOTE — FLOWSHEET NOTE
02/20/25 1039   Vital Signs   Temp 98.6 °F (37 °C)   Temp Source Oral   Pulse (!) 103   Heart Rate Source Monitor   Respirations 18   /67   MAP (Calculated) 81   BP Location Right upper arm   BP Method Automatic   Patient Position Semi emmettwlers   Pain Assessment   Pain Assessment None - Denies Pain   Pain Level 0   Care Plan - Pain Goals   Verbalizes/displays adequate comfort level or baseline comfort level Encourage patient to monitor pain and request assistance;Assess pain using appropriate pain scale   Opioid-Induced Sedation   POSS Score 1   RASS   Maria Agitation Sedation Scale (RASS) 0   Oxygen Therapy   SpO2 93 %   O2 Device None (Room air)       VS as shown above. Spoke with cardiology in person. Awaiting further orders.

## 2025-02-20 NOTE — PLAN OF CARE
Problem: Discharge Planning  Goal: Discharge to home or other facility with appropriate resources  2/20/2025 1142 by Estella Real RN  Outcome: Progressing  2/20/2025 0640 by Nisha Galvez RN  Outcome: Progressing     Problem: Safety - Adult  Goal: Free from fall injury  2/20/2025 1142 by Estella Real RN  Outcome: Progressing  2/20/2025 0640 by Nisha Galvez RN  Outcome: Progressing     Problem: Pain  Goal: Verbalizes/displays adequate comfort level or baseline comfort level  2/20/2025 1142 by Estella Real RN  Outcome: Progressing  Flowsheets  Taken 2/20/2025 1039  Verbalizes/displays adequate comfort level or baseline comfort level:   Encourage patient to monitor pain and request assistance   Assess pain using appropriate pain scale  Taken 2/20/2025 0903  Verbalizes/displays adequate comfort level or baseline comfort level:   Encourage patient to monitor pain and request assistance   Assess pain using appropriate pain scale  2/20/2025 0640 by Nisha Galvez RN  Outcome: Progressing     Problem: Skin/Tissue Integrity  Goal: Skin integrity remains intact  Description: 1.  Monitor for areas of redness and/or skin breakdown  2.  Assess vascular access sites hourly  3.  Every 4-6 hours minimum:  Change oxygen saturation probe site  4.  Every 4-6 hours:  If on nasal continuous positive airway pressure, respiratory therapy assess nares and determine need for appliance change or resting period  2/20/2025 1142 by Estella Real RN  Outcome: Progressing  Flowsheets (Taken 2/20/2025 1141)  Skin Integrity Remains Intact: Monitor for areas of redness and/or skin breakdown  2/20/2025 0640 by Nisha Galvez RN  Outcome: Progressing  Flowsheets (Taken 2/20/2025 0015)  Skin Integrity Remains Intact:   Monitor for areas of redness and/or skin breakdown   Assess vascular access sites hourly   Every 4-6 hours minimum: Change oxygen saturation probe site   Every 4-6 hours: If on nasal continuous positive

## 2025-02-20 NOTE — PROGRESS NOTES
Shift assessment complete. VSS. Patient A/OX4. Pt denies pain or N/V/D. Scheduled meds given. See MAR. Pt denies needs at this time. Call light within reach. Bed alarm on.

## 2025-02-20 NOTE — PROGRESS NOTES
2038: Patient converted into Afib RVR on telemetry monitor rate up to 160s.  STAT EKG ordered to confirm.  Patient asymptomatic, /58.      2103: EKG completed - showed Afib RVR.      2104: Cardiology paged, /72     2106: Return call from Cardiology - orders placed to IV Metoprolol 2.5mg, repeat BP in 15 minutes if rate remains elevated and SBP >105 give another 2.5mg of IV Metoprolol     2126: 2.5mg IV Metoprolol given rate improved to the 90s remains Afib

## 2025-02-20 NOTE — PLAN OF CARE
Problem: Discharge Planning  Goal: Discharge to home or other facility with appropriate resources  Outcome: Progressing     Problem: Safety - Adult  Goal: Free from fall injury  Outcome: Progressing     Problem: Pain  Goal: Verbalizes/displays adequate comfort level or baseline comfort level  Outcome: Progressing     Problem: Skin/Tissue Integrity  Goal: Skin integrity remains intact  Description: 1.  Monitor for areas of redness and/or skin breakdown  2.  Assess vascular access sites hourly  3.  Every 4-6 hours minimum:  Change oxygen saturation probe site  4.  Every 4-6 hours:  If on nasal continuous positive airway pressure, respiratory therapy assess nares and determine need for appliance change or resting period  Outcome: Progressing  Flowsheets (Taken 2/20/2025 0015)  Skin Integrity Remains Intact:   Monitor for areas of redness and/or skin breakdown   Assess vascular access sites hourly   Every 4-6 hours minimum: Change oxygen saturation probe site   Every 4-6 hours: If on nasal continuous positive airway pressure, respiratory therapy assesses nares and determine need for appliance change or resting period     Problem: Nutrition Deficit:  Goal: Optimize nutritional status  Outcome: Progressing

## 2025-02-20 NOTE — PROGRESS NOTES
CMU notified writer that patient was having SVT HR 160s, PAT, now converted to A fib around 10AM. Cardiology notified via perfect serve. Hospitalist notified.

## 2025-02-20 NOTE — PROGRESS NOTES
Shift Summary    Admission Diagnosis: Generalized weakness    Shift Events:  Patient converted to Afib RVR - 2.5mg IV Metoprolol given.  Converted back to SR with PACs around 3am   Rested well overnight     Vitals:  Vitals:    02/19/25 2130 02/19/25 2344 02/20/25 0400 02/20/25 0735   BP:  99/65 104/67    Pulse: 98 99 92    Resp:  18  18   Temp:  97.6 °F (36.4 °C) 97.5 °F (36.4 °C)    TempSrc:  Axillary Oral    SpO2:  97% 99% 97%   Weight:       Height:            WEIGHT: Admit Weight - Scale: 54.4 kg (119 lb 14.9 oz)      Today  Weight - Scale: 54.2 kg (119 lb 8 oz)    Tele:  normal sinus with PACs     IV/Line:  Peripheral IV 02/17/25 Left Antecubital (Active)   Site Assessment Clean, dry & intact 02/20/25 0600   Line Status Normal saline locked 02/20/25 0600   Line Care Connections checked and tightened 02/20/25 0600   Phlebitis Assessment No symptoms 02/20/25 0600   Infiltration Assessment 0 02/20/25 0600   Alcohol Cap Used Yes 02/20/25 0600   Dressing Status Clean, dry & intact 02/20/25 0600   Dressing Type Transparent 02/20/25 0600     Neuro:   oriented to time, place, person and situation    I/O:   No intake/output data recorded.

## 2025-02-20 NOTE — PROGRESS NOTES
Bed side report given to FAROOQ Cameron. Patient awake in bed, denies needs. Call light within reach. Bed alarm on.

## 2025-02-20 NOTE — PROGRESS NOTES
[facility]                                 Pulmonary/Critical care  Consult /Progress Note :                                                                  CC :worsening SOB ,caviatry lesion     HISTORY OF PRESENT ILLNESS:   Doing better  Mild cough  No CP  Yellow sputum    AFB + and enterobacter colacae 2 weeks ago  Developed A fib     PHYSICAL EXAM:  Vitals:    02/20/25 0735   BP:    Pulse:    Resp: 18   Temp:    SpO2: 97%     Gen: No distress.   Eyes: PERRL. No sclera icterus. No conjunctival injection.   ENT: No discharge. Pharynx clear.   Neck: Trachea midline. No obvious mass.    Resp: Rhonchi   CV: Regular rate. Regular rhythm. No murmur or rub. No edema. Peripheral pulses are 2+.  Capillary refill is less than 3 seconds.  GI: Non-tender. Non-distended. No hernia.   Skin: Warm and dry. No nodule on exposed extremities.   Lymph: No cervical LAD. No supraclavicular LAD.   M/S: No cyanosis. No joint deformity. No clubbing.   Neuro: Awake. Alert. Moves all four extremities.   Psych: Oriented x 3. No anxiety.     LABS:  CBC:   Recent Labs     02/18/25  0516 02/19/25  0527 02/20/25  0526   WBC 14.6* 9.7 11.3*   HGB 9.6* 9.1* 9.7*   HCT 29.7* 27.8* 29.5*   MCV 82.5 81.9 83.1   * 540* 538*     BMP:   Recent Labs     02/18/25  0516 02/19/25  0527 02/20/25  0526    136 137   K 3.8 3.5 3.8    101 103   CO2 21 26 24   PHOS  --   --  3.1   BUN 27* 17 19   CREATININE 0.9 0.8 0.7*     LIVER PROFILE:   Recent Labs     02/17/25  1031   AST 18   ALT 6*   BILITOT 0.3   ALKPHOS 88     PT/INR: No results for input(s): \"PROTIME\", \"INR\" in the last 72 hours.  APTT: No results for input(s): \"APTT\" in the last 72 hours.  UA:  Recent Labs     02/17/25  1031   COLORU Yellow   PHUR 5.5   CLARITYU Clear   LEUKOCYTESUR Negative   UROBILINOGEN 0.2   BILIRUBINUR Negative   BLOODU Negative   GLUCOSEU Negative        Microbiology:Imaging:  From    AFB   E colacae complex on 1/30   Pneumonia panel 2/17  rganism Enterobacter cloacae complex DNA Detected Abnormal      Pneumonia Panel Molecular --    >=10,000,000 copies/mL  See additional report for complete Pneumonia panel.    Organism Haemophilus influenzae DNA Detected Abnormal     Pneumonia Panel Molecular --    >=10,000,000 copies/mL  See additional report for complete Pneumonia panel.    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 ,infection  Possible bacterial pneumonia   COPD  RA on MTX  Remote  smoker 40 PPY         PLAN:  Since had recent Bronch and + AFB ,will hold on Bronch   Need follow up and Biopsy if not resolved or CT in 8 weeks   Want to follow up in 6 weeks,live close to our office ,daughter want him followed by [facility]  Procal 0.36   Was started on Vancomycin and cefpeime ,doubt we need it ,will deescalte next 1-2 days  ID following .defer abx to ID PO   *-work up for pneumonia ,shows moraxella abd H influenza and E colacae  *-sputum culture reviewed   *_ Per ID no need for abx   *-need repeat CT to make sure of resolution in 6 -8 weeks    If not improving ,will consider bronch   DVT px  Office notified

## 2025-02-20 NOTE — PROGRESS NOTES
IM Progress Note    Admit Date:  2/17/2025    Admitted for worsening shortness of breath and cavitary lesion  Was at  in January with work-up as well    Patient seen by pulmonology and ID.   Respiratory cultures growing haemophilus influenza, Enterobacter and Moraxella.    Patient with rhythm issues on telemetry today - episode of paroxysmal A-fib- converted back to normal sinus rhythm.  Telemetry with PACs now.   Hypomagnesemia and hypokalemia being repleted      Subjective:  Mr. Padilla .  He appears weak and fatigued but in no distress .  Vital signs are stable  Denies any chest pains or shortness of breath  No fever .  she is on room air       2/20- doing better. In a fib. Seen by cardiology. No plans for NTM treatment.     Objective:   Patient Vitals for the past 4 hrs:   BP Temp Temp src Pulse Resp SpO2   02/20/25 1039 108/67 98.6 °F (37 °C) Oral (!) 103 18 93 %   02/20/25 0903 (!) 118/58 97.9 °F (36.6 °C) Oral 85 18 94 %          Intake/Output Summary (Last 24 hours) at 2/20/2025 1236  Last data filed at 2/20/2025 0903  Gross per 24 hour   Intake 582 ml   Output 1250 ml   Net -668 ml       Physical Exam:  Gen: No distress. Alert. Appears chronically ill, thin pleasant elderly male   Eyes: PERRL. No sclera icterus. No conjunctival injection.   ENT: No discharge. Pharynx clear.   Neck: No JVD. Trachea midline.  Resp: No accessory muscle use. Diminished throughout. No crackles. No wheezes. No rhonchi.   CV: irregular rate and rhythm. No murmur.  No rub. No edema.   Capillary Refill: Brisk,< 3 seconds   GI: Non-tender. Non-distended.  Normal bowel sounds.  Skin: Warm and dry. No nodule on exposed extremities. No rash on exposed extremities.   M/S: No cyanosis. No joint deformity. No clubbing.   Neuro: Awake. Grossly nonfocal    Psych: Oriented x 3. No anxiety or agitation.      Scheduled Meds:   apixaban  2.5 mg Oral BID    amiodarone  400 mg Oral BID    amoxicillin-clavulanate  1 tablet Oral BID         Assessment/Plan:    Worsening cavitary lesions  S/p bronch-   Reactive mediastinal lymphadenopathy   COPD  +Enterobacter cloacae in sputum previous admission   - pulmonary consulted  - Infectious disease consulted  - CT as above  - TB quant negative AFB TB PCR negative x2 previous admission at   - started on Vancomycin and Cefepime D# 3-changed to PO Augmentin.  - procal 0.36  - pneumonia panel pending   - MRSA nasal swab pending   - hx of igG monoclonal gammopathy     UTE  - creatinine 1.4 on admission  - resolved  - likely 2/2 poor po intake  -monitor     Thrombocytosis  Hx of Essential thrombocythemia  - platelets 581--635  - likely 2/2 infection  - if no improvement consider hematology consult     Generalized Weakness  - PT/OT consulted    Rheumatoid Arthritis  - on methotrexate - holding     HTN  - controlled continue home meds: Lisinopril and HCTZ     Chronic anemia  - hemoglobin 9.6  - appears stable from previous admission  - check ferritin, IRON/TIBC  - monitor CBC  - check vitamin B12 and folate    Hypomagnesemia - replete  Hypokalemia - replete     Tele  - new onset paroxysmal A-fib.  -Check echocardiogram, check TSH, consult cardiology  -Replace electrolytes      Palliative care consulted  - discussed with patient and daughter   they want to remain a full code and await cultures to make further decision      Note above makes patient higher risk for morbidity and mortality requiring testing and treatment.      DVT Prophylaxis: Lovenox   Diet: ADULT DIET; Regular; Low Sodium (2 gm)  ADULT ORAL NUTRITION SUPPLEMENT; Breakfast, Lunch, Dinner; Standard High Calorie/High Protein Oral Supplement  Code Status: Full Code      ALEXA MATHEWS MD    2/20/2025

## 2025-02-21 VITALS
DIASTOLIC BLOOD PRESSURE: 78 MMHG | SYSTOLIC BLOOD PRESSURE: 110 MMHG | OXYGEN SATURATION: 93 % | BODY MASS INDEX: 17.7 KG/M2 | RESPIRATION RATE: 20 BRPM | HEART RATE: 78 BPM | HEIGHT: 69 IN | WEIGHT: 119.5 LBS | TEMPERATURE: 98 F

## 2025-02-21 LAB
BACTERIA BLD CULT ORG #2: NORMAL
BACTERIA BLD CULT: NORMAL
BACTERIA SPEC RESP CULT: ABNORMAL
BACTERIA SPEC RESP CULT: ABNORMAL
GRAM STN SPEC: ABNORMAL
MAGNESIUM SERPL-MCNC: 1.92 MG/DL (ref 1.8–2.4)
ORGANISM: ABNORMAL

## 2025-02-21 PROCEDURE — 6370000000 HC RX 637 (ALT 250 FOR IP): Performed by: INTERNAL MEDICINE

## 2025-02-21 PROCEDURE — 99232 SBSQ HOSP IP/OBS MODERATE 35: CPT | Performed by: INTERNAL MEDICINE

## 2025-02-21 PROCEDURE — 36415 COLL VENOUS BLD VENIPUNCTURE: CPT

## 2025-02-21 PROCEDURE — 94640 AIRWAY INHALATION TREATMENT: CPT

## 2025-02-21 PROCEDURE — 2700000000 HC OXYGEN THERAPY PER DAY

## 2025-02-21 PROCEDURE — 99238 HOSP IP/OBS DSCHRG MGMT 30/<: CPT | Performed by: INTERNAL MEDICINE

## 2025-02-21 PROCEDURE — 2500000003 HC RX 250 WO HCPCS

## 2025-02-21 PROCEDURE — 6370000000 HC RX 637 (ALT 250 FOR IP)

## 2025-02-21 PROCEDURE — 94761 N-INVAS EAR/PLS OXIMETRY MLT: CPT

## 2025-02-21 PROCEDURE — 83735 ASSAY OF MAGNESIUM: CPT

## 2025-02-21 RX ORDER — AMIODARONE HYDROCHLORIDE 200 MG/1
TABLET ORAL
Qty: 58 TABLET | Refills: 0 | Status: SHIPPED | OUTPATIENT
Start: 2025-02-21 | End: 2025-04-06

## 2025-02-21 RX ORDER — BENZONATATE 100 MG/1
100 CAPSULE ORAL 3 TIMES DAILY PRN
Qty: 21 CAPSULE | Refills: 0 | Status: SHIPPED | OUTPATIENT
Start: 2025-02-21 | End: 2025-02-28

## 2025-02-21 RX ORDER — METOPROLOL SUCCINATE 25 MG/1
25 TABLET, EXTENDED RELEASE ORAL DAILY
Qty: 30 TABLET | Refills: 0 | Status: SHIPPED | OUTPATIENT
Start: 2025-02-22

## 2025-02-21 RX ORDER — CEFDINIR 300 MG/1
300 CAPSULE ORAL 2 TIMES DAILY
Qty: 10 CAPSULE | Refills: 0 | Status: SHIPPED | OUTPATIENT
Start: 2025-02-21 | End: 2025-02-26

## 2025-02-21 RX ORDER — AMIODARONE HYDROCHLORIDE 200 MG/1
TABLET ORAL
Qty: 72 TABLET | Refills: 0 | Status: SHIPPED | OUTPATIENT
Start: 2025-02-21 | End: 2025-02-21

## 2025-02-21 RX ADMIN — APIXABAN 2.5 MG: 5 TABLET, FILM COATED ORAL at 10:04

## 2025-02-21 RX ADMIN — AMOXICILLIN AND CLAVULANATE POTASSIUM 1 TABLET: 875; 125 TABLET, FILM COATED ORAL at 10:03

## 2025-02-21 RX ADMIN — GUAIFENESIN 600 MG: 600 TABLET, EXTENDED RELEASE ORAL at 10:03

## 2025-02-21 RX ADMIN — METOPROLOL SUCCINATE 25 MG: 25 TABLET, FILM COATED, EXTENDED RELEASE ORAL at 10:04

## 2025-02-21 RX ADMIN — GABAPENTIN 200 MG: 100 CAPSULE ORAL at 10:03

## 2025-02-21 RX ADMIN — GABAPENTIN 200 MG: 100 CAPSULE ORAL at 14:16

## 2025-02-21 RX ADMIN — BUDESONIDE AND FORMOTEROL FUMARATE DIHYDRATE 2 PUFF: 160; 4.5 AEROSOL RESPIRATORY (INHALATION) at 07:38

## 2025-02-21 RX ADMIN — TIOTROPIUM BROMIDE INHALATION SPRAY 2 PUFF: 3.12 SPRAY, METERED RESPIRATORY (INHALATION) at 07:38

## 2025-02-21 RX ADMIN — AMIODARONE HYDROCHLORIDE 400 MG: 200 TABLET ORAL at 10:03

## 2025-02-21 RX ADMIN — SODIUM CHLORIDE, PRESERVATIVE FREE 10 ML: 5 INJECTION INTRAVENOUS at 10:17

## 2025-02-21 NOTE — CARE COORDINATION
DISCHARGE ORDER  Date/Time 2025 2:16 PM  Completed by: Bertha Lu, Case Management    Patient Name: Lucio Padilla      : 1945  Admitting Diagnosis: Generalized weakness [R53.1]  UTE (acute kidney injury) [N17.9]  Cavitary lesion of lung [J98.4]      Admit order Date and Status:25  (verify MD's last order for status of admission)      Noted discharge order.   If applicable PT/OT recommendation at Discharge: HHC  DME recommendation by PT/OT:na  Confirmed discharge plan : Yes  with family at bedside     Discharge Plan: Pt to dc home with family.  Will be new O2 set up and HHC.    Home Oxygen and Respiratory Equipment:     Has HOME OXYGEN prior to admission: No    If applicable: Informed of need to bring portable home O2 tank on day of DISCHARGE for nursing to connect prior to leaving: Yes  Verbalized agreement/Understanding: Yes    Current Liter Flow/min:  1 LPM    Oxygen Provider:   [facility]  [address] [place] [address]  Phone:  [phone]  Fax: [phone]       Home oxygen evaluation and DME order on file: Y     Pt sent home with a POC and an E-tank.      Home Care Information:   Is patient resuming current home health care services: No    Home Care Agency:   [facility]   [phone]         Services: pt/ot/sn  Home Health Order Obtained: Y    Home health agency notified of discharge.      Date of Last IMM Given: 25    CM delivered second IMM to patient. Verbal explanation provided of 4 hours to review notice. Patient voiced understanding and is agreeable to discharge.     Reviewed chart.  Role of discharge planner explained and patient verbalized understanding. Discharge order is noted.      Pt is being d/c'd to home today. Pt's O2 sats are 93% on 1L.    Discharge timeout done with CM/Pt/RN. All discharge needs and concerns addressed.

## 2025-02-21 NOTE — CARE COORDINATION
Discharge Planning Note:    CM has been informed of need for home oxygen at discharge. Home oxygen evaluation to be completed within 24hrs of discharge. CM has sent a referral to [facility] to verify home O2 benefits and qualifying diagnosis.  After approval received, supplier will provide pt with portable O2 tank and set up home concentrator post-discharge. Supplier to instruct pt on safe use of safe equipment handling and supplies.

## 2025-02-21 NOTE — PROGRESS NOTES
CARDIOLOGY PROGRESS NOTE        Patient Name: Lucio Padilla  Date of admission: 2/17/2025  9:55 AM  Admission Dx: Generalized weakness [R53.1]  UTE (acute kidney injury) [N17.9]  Cavitary lesion of lung [J98.4]  Requesting Physician: Norma Retana DO  Primary Care physician: Olimpia Lu MD    Reason for Consultation/Chief Complaint: Paroxysmal atrial fibrillation    History of Present Illness:     Lucio Padilla is a 79 y.o. 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    Patient's course has been complicated by acute kidney injury and now development of paroxysmal atrial fibrillation.  EKG today shows atrial fibrillation with RVR, heart rate 124 bpm.  Previous EKG 2/17 showed sinus rhythm with baseline artifact.  TSH within normal limits.  Magnesium 1.5, potassium 3.5, and creatinine recovered to 0.8. Electrolytes were replaced today.     Patient evaluated 2/19.  Noted struggling with dyspnea on exertion and sputum production over time.  No hemoptysis.  No palpitations despite new onset atrial fibrillation this admission.  Noted sinus at time of my evaluation.  Started on metoprolol XL once daily and Lovenox was made therapeutic dosing until clarification of procedures at which time we would then start Eliquis.    2/20 - the patient went back in atrial fibrillation and was administered 2.5 mg IV metoprolol in AM and subsequently converted to sinus rhythm with PACs.  No fever documented.  1 L oxygen therapy.  Magnesium/potassium corrected.  He continues to feel no palpitations. On review of telemetry, pAF/AT with rates briefly as high as 160s. He felt no palpitations or chest pain during this time. Remains somewhat

## 2025-02-21 NOTE — PROGRESS NOTES
O2 Sat at rest on room air is 92 %.  O2 Sat with activity on room air is 85 %.  O2 Sat at rest with oxygen @  1 lpm is 93%.  O2 Sat with activity with oxygen @ 1 lpm is 92%.

## 2025-02-21 NOTE — DISCHARGE INSTR - COC
Continuity of Care Form    Patient Name: Lucio Padilla   :  1945  MRN:  [id number]    Admit date:  2025  Discharge date:  ***    Code Status Order: Full Code   Advance Directives:   Advance Care Flowsheet Documentation             Admitting Physician:  Norma Retana DO  PCP: Olimpia Lu MD    Discharging Nurse: ***  Discharging Hospital Unit/Room#: 0204/0204-01  Discharging Unit Phone Number: ***    Emergency Contact:   Extended Emergency Contact Information  Primary Emergency Contact: Babita Padilla  Home Phone: [phone]  Mobile Phone: [phone]  Relation: Child    Past Surgical History:  Past Surgical History:   Procedure Laterality Date    COLONOSCOPY      CT NEEDLE BIOPSY LUNG PERCUTANEOUS W IMAGING GUIDANCE  2021    CT NEEDLE BIOPSY LUNG PERCUTANEOUS 2021 [facility] CT    ENDOSCOPY, COLON, DIAGNOSTIC      EYE SURGERY      TONSILLECTOMY         Immunization History:   Immunization History   Administered Date(s) Administered    COVID-19, PFIZER Bivalent, DO NOT Dilute, (age 12y+), IM, 30 mcg/0.3 mL 2023    COVID-19, PFIZER PURPLE top, DILUTE for use, (age 12 y+), 30mcg/0.3mL 2021, 2021       Active Problems:  Patient Active Problem List   Diagnosis Code    Hypertension I10    COPD (chronic obstructive pulmonary disease) ([facility]) J44.9    Leukocytosis D72.829    Sepsis ([facility]) A41.9    Tachycardia R00.0    Infection of right wrist, possible septic joint B99.9    Acute gout of left hand M10.9    Left hand pain M79.642    COPD with acute exacerbation ([facility]) J44.1    Generalized weakness R53.1    Vitamin B 12 deficiency E53.8    Thrombocytosis D75.839    Cavitary lesion of lung J98.4    Paroxysmal atrial fibrillation ([facility]) I48.0    UTE (acute kidney injury) N17.9    Shortness of breath R06.02    Severe protein-calorie malnutrition E43    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

## 2025-02-21 NOTE — PLAN OF CARE
Problem: Discharge Planning  Goal: Discharge to home or other facility with appropriate resources  2/21/2025 1143 by Lucia Vasquez, RN  Outcome: Progressing  Flowsheets (Taken 2/21/2025 1001)  Discharge to home or other facility with appropriate resources:   Identify barriers to discharge with patient and caregiver   Arrange for needed discharge resources and transportation as appropriate   Identify discharge learning needs (meds, wound care, etc)   Arrange for interpreters to assist at discharge as needed   Refer to discharge planning if patient needs post-hospital services based on physician order or complex needs related to functional status, cognitive ability or social support system  2/21/2025 0011 by Abran Rangel, RN  Outcome: Progressing  Flowsheets (Taken 2/20/2025 2029)  Discharge to home or other facility with appropriate resources: Identify barriers to discharge with patient and caregiver     Problem: Safety - Adult  Goal: Free from fall injury  2/21/2025 1143 by Lucia Vasquez, RN  Outcome: Progressing  2/21/2025 0011 by Abran Rangel RN  Outcome: Progressing     Problem: Pain  Goal: Verbalizes/displays adequate comfort level or baseline comfort level  2/21/2025 1143 by Lucia Vasquez, RN  Outcome: Progressing  Flowsheets  Taken 2/21/2025 0945 by Lucia Vasquez RN  Verbalizes/displays adequate comfort level or baseline comfort level:   Encourage patient to monitor pain and request assistance   Assess pain using appropriate pain scale   Administer analgesics based on type and severity of pain and evaluate response   Implement non-pharmacological measures as appropriate and evaluate response   Consider cultural and social influences on pain and pain management   Notify Licensed Independent Practitioner if interventions unsuccessful or patient reports new pain  Taken 2/21/2025 0230 by Abran Rangel, RN  Verbalizes/displays adequate comfort level or baseline comfort level: Encourage  FAROOQ Osullivan  Outcome: Progressing     Problem: Respiratory - Adult  Goal: Achieves optimal ventilation and oxygenation  2/21/2025 1143 by Lucia Vasquez, RN  Outcome: Progressing  Flowsheets (Taken 2/21/2025 1001)  Achieves optimal ventilation and oxygenation:   Assess for changes in respiratory status   Assess for changes in mentation and behavior   Position to facilitate oxygenation and minimize respiratory effort   Oxygen supplementation based on oxygen saturation or arterial blood gases   Initiate smoking cessation protocol as indicated   Encourage broncho-pulmonary hygiene including cough, deep breathe, incentive spirometry   Assess the need for suctioning and aspirate as needed   Assess and instruct to report shortness of breath or any respiratory difficulty   Respiratory therapy support as indicated  2/21/2025 0011 by Abran Rangel, FAROOQ  Outcome: Progressing     Problem: Cardiovascular - Adult  Goal: Maintains optimal cardiac output and hemodynamic stability  2/21/2025 1143 by Lucia Vasquez, RN  Outcome: Progressing  Flowsheets (Taken 2/21/2025 1001)  Maintains optimal cardiac output and hemodynamic stability:   Monitor blood pressure and heart rate   Monitor urine output and notify Licensed Independent Practitioner for values outside of normal range   Assess for signs of decreased cardiac output   Administer fluid and/or volume expanders as ordered   Administer vasoactive medications as ordered   For PPHN infants, administer sedation as ordered and minimize all controllable stressors.  2/21/2025 0011 by Abran Rangel, FAROOQ  Outcome: Progressing     Problem: Skin/Tissue Integrity - Adult  Goal: Skin integrity remains intact  2/21/2025 1143 by Lucia Vasquez, RN  Outcome: Progressing  Flowsheets  Taken 2/21/2025 1141 by Lucia Vasquez, RN  Skin Integrity Remains Intact:   Monitor for areas of redness and/or skin breakdown   Assess vascular access sites hourly   Every 4-6 hours minimum: Change

## 2025-02-21 NOTE — DISCHARGE SUMMARY
Name:  Lucio Padilla  Room:  0204/0204-01  MRN:    [id number]    Discharge Summary      This discharge summary is in conjunction with a complete physical exam done on the day of discharge.    Discharging Physician: Dr. Centeno      Admit: 2/17/2025  Discharge:  2/21/2025    HPI taken from admission H&P:    79 y.o. male who presented to [place] with past medical history of COPD, emphysema, hypertension presented ED with chief complaint of generalized weakness cough phlegm reported that he was recently at  hospitalized for 2 weeks and was discharged after she got discharged she was feeling fine and then suddenly all of a sudden he got much worse with worsening shortness of breath cough phlegm production thus came for further evaluation.  He did not see any warning sign denied having any smoking exposures to fumes no reconstruction.      Diagnoses this Admission and Hospital Course   Worsening cavitary lesions  S/p bronch-   Reactive mediastinal lymphadenopathy   COPD  +Enterobacter cloacae in sputum previous admission   - pulmonary consulted  - Infectious disease consulted  - CT as above  - TB quant negative AFB TB PCR negative x2 previous admission at   - started on Vancomycin and Cefepime D# 3-changed to PO Augmentin.  - procal 0.36  - pna panel as below-enterobacter, h influenzae, moraxella  - MRSA nasal swab pending   - hx of igG monoclonal gammopathy   -omnicef at [facility]   -will need 1 L O2 at discharge, discussed with patient face to face     UTE  - creatinine 1.4 on admission  - resolved  - likely 2/2 poor po intake  -monitor      Thrombocytosis  Hx of Essential thrombocythemia  - platelets 581--635  - likely 2/2 infection  - if no improvement consider hematology consult   -stable      Generalized Weakness  - PT/OT consulted     Rheumatoid Arthritis  - on methotrexate - holding   -resume      HTN  - controlled continue home meds: Lisinopril and HCTZ   --> stopped lisinopril HCTZ   -now on  out since the antigen present in the sample  may be below the detection limit of the test.  Normal Range:Presumptive Negative      Narrative:      ORDER#: [id number]                          ORDERED BY: FRANKLIN CHUNG  SOURCE: Urine Clean Catch                  COLLECTED:  02/17/25 10:31  ANTIBIOTICS AT MAHNAZ.:                      RECEIVED :  02/17/25 20:38    Legionella antigen, urine [[id number]] Collected: 02/17/25 1031    Order Status: Completed Specimen: Urine, clean catch Updated: 02/18/25 0745     L. pneumophila Serogp 1 Ur Ag --     Presumptive Negative  No Legionella pneumophila serogroup 1 antigens detected.  A negative result does not exclude infection with  Legionella pneumophila serogroup 1 nor does it rule out  other microbial-caused respiratory infections or  disease caused by other serogroups of  Legionella pneumophila.  Normal Range: Presumptive Negative      Narrative:      ORDER#: [id number]                          ORDERED BY: FRANKLIN CHUNG  SOURCE: Urine Clean Catch                  COLLECTED:  02/17/25 10:31  ANTIBIOTICS AT [facility]HNAZ.:                      RECEIVED :  02/17/25 20:38    Legionella antigen, urine [[id number]] Collected: 02/17/25 0000    Order Status: Canceled Specimen: Urine, clean catch               RADIOLOGY  CT CHEST WO CONTRAST   Final Result   Biapical cavitary lesions, left greater than right, progressed compared to   prior CT dated 01/08/2024, consistent with a worsening cavitating infectious   or inflammatory process, superimposed on emphysematous lungs.  No discrete   suspicious pulmonary nodularity.      Reactive mediastinal lymphadenopathy.         XR CHEST PORTABLE   Final Result   Chronic lung changes/scarring with new consolidation in the medial aspect of   the left upper lobe concerning for mass or pneumonia.               Discharge Medications     Medication List        START taking these medications      amiodarone 200 MG tablet  Commonly known as: CORDARONE  Take 1

## 2025-02-21 NOTE — PLAN OF CARE
Problem: Discharge Planning  Goal: Discharge to home or other facility with appropriate resources  2/21/2025 1605 by Lucia Vasquez RN  Outcome: Completed  2/21/2025 1143 by Lucia Vasquez RN  Outcome: Progressing  Flowsheets (Taken 2/21/2025 1001)  Discharge to home or other facility with appropriate resources:   Identify barriers to discharge with patient and caregiver   Arrange for needed discharge resources and transportation as appropriate   Identify discharge learning needs (meds, wound care, etc)   Arrange for interpreters to assist at discharge as needed   Refer to discharge planning if patient needs post-hospital services based on physician order or complex needs related to functional status, cognitive ability or social support system     Problem: Safety - Adult  Goal: Free from fall injury  2/21/2025 1605 by Lucia Vasquez RN  Outcome: Completed  2/21/2025 1143 by Lucia Vasquez RN  Outcome: Progressing     Problem: Pain  Goal: Verbalizes/displays adequate comfort level or baseline comfort level  2/21/2025 1605 by Lucia Vasquez, RN  Outcome: Completed  Flowsheets (Taken 2/21/2025 1603)  Verbalizes/displays adequate comfort level or baseline comfort level:   Encourage patient to monitor pain and request assistance   Assess pain using appropriate pain scale   Administer analgesics based on type and severity of pain and evaluate response   Implement non-pharmacological measures as appropriate and evaluate response   Consider cultural and social influences on pain and pain management   Notify Licensed Independent Practitioner if interventions unsuccessful or patient reports new pain  2/21/2025 1143 by Lucia Vasquez, RN  Outcome: Progressing  Flowsheets  Taken 2/21/2025 0945 by Lucia Vasquez, RN  Verbalizes/displays adequate comfort level or baseline comfort level:   Encourage patient to monitor pain and request assistance   Assess pain using appropriate pain scale    Completed  2/21/2025 1143 by Lucia Vasquez, RN  Outcome: Progressing  Flowsheets (Taken 2/21/2025 1001)  Achieves optimal ventilation and oxygenation:   Assess for changes in respiratory status   Assess for changes in mentation and behavior   Position to facilitate oxygenation and minimize respiratory effort   Oxygen supplementation based on oxygen saturation or arterial blood gases   Initiate smoking cessation protocol as indicated   Encourage broncho-pulmonary hygiene including cough, deep breathe, incentive spirometry   Assess the need for suctioning and aspirate as needed   Assess and instruct to report shortness of breath or any respiratory difficulty   Respiratory therapy support as indicated     Problem: Cardiovascular - Adult  Goal: Maintains optimal cardiac output and hemodynamic stability  2/21/2025 1605 by Lucia Vasquez, RN  Outcome: Completed  2/21/2025 1143 by Lucia Vasquez, RN  Outcome: Progressing  Flowsheets (Taken 2/21/2025 1001)  Maintains optimal cardiac output and hemodynamic stability:   Monitor blood pressure and heart rate   Monitor urine output and notify Licensed Independent Practitioner for values outside of normal range   Assess for signs of decreased cardiac output   Administer fluid and/or volume expanders as ordered   Administer vasoactive medications as ordered   For PPHN infants, administer sedation as ordered and minimize all controllable stressors.     Problem: Skin/Tissue Integrity - Adult  Goal: Skin integrity remains intact  2/21/2025 1605 by Lucia Vasquez, RN  Outcome: Completed  2/21/2025 1143 by Lucia Vasquez, RN  Outcome: Progressing  Flowsheets  Taken 2/21/2025 1141 by Lucia Vasquez, RN  Skin Integrity Remains Intact:   Monitor for areas of redness and/or skin breakdown   Assess vascular access sites hourly   Every 4-6 hours minimum: Change oxygen saturation probe site   Every 4-6 hours: If on nasal continuous positive airway pressure,

## 2025-02-21 NOTE — FLOWSHEET NOTE
02/21/25 0945   Vital Signs   Temp 98.2 °F (36.8 °C)   Temp Source Oral   Pulse 85   Heart Rate Source Monitor   Respirations 18   /65   MAP (Calculated) 80   Pain Assessment   Pain Assessment None - Denies Pain   Care Plan - Pain Goals   Verbalizes/displays adequate comfort level or baseline comfort level Encourage patient to monitor pain and request assistance;Assess pain using appropriate pain scale;Administer analgesics based on type and severity of pain and evaluate response;Implement non-pharmacological measures as appropriate and evaluate response;Consider cultural and social influences on pain and pain management;Notify Licensed Independent Practitioner if interventions unsuccessful or patient reports new pain   Opioid-Induced Sedation   POSS Score 1   Oxygen Therapy   SpO2 93 %   O2 Device Nasal cannula   O2 Flow Rate (L/min) 1 L/min     Patient resting quietly in bed,respirations easy and even lung sounds decreased. SpO2 on room air is 91%. Will do a walk test. VSS. Denies complains of. Voiding clear chetan urine. Will continue to monitor. Call  bell and bedside table within reach.

## 2025-02-21 NOTE — PLAN OF CARE
Problem: Discharge Planning  Goal: Discharge to home or other facility with appropriate resources  2/21/2025 0011 by Abran Rangel RN  Outcome: Progressing  Flowsheets (Taken 2/20/2025 2029)  Discharge to home or other facility with appropriate resources: Identify barriers to discharge with patient and caregiver  2/20/2025 1142 by Estella Real RN  Outcome: Progressing     Problem: Safety - Adult  Goal: Free from fall injury  2/21/2025 0011 by Abran Rangel RN  Outcome: Progressing  2/20/2025 1142 by Estella Real RN  Outcome: Progressing     Problem: Pain  Goal: Verbalizes/displays adequate comfort level or baseline comfort level  2/21/2025 0011 by Abran Rangel RN  Outcome: Progressing  Flowsheets  Taken 2/20/2025 2027 by Abran Rangel RN  Verbalizes/displays adequate comfort level or baseline comfort level:   Encourage patient to monitor pain and request assistance   Assess pain using appropriate pain scale  Taken 2/20/2025 1253 by Estella Real RN  Verbalizes/displays adequate comfort level or baseline comfort level:   Assess pain using appropriate pain scale   Encourage patient to monitor pain and request assistance  2/20/2025 1142 by Estella Real RN  Outcome: Progressing  Flowsheets  Taken 2/20/2025 1039  Verbalizes/displays adequate comfort level or baseline comfort level:   Encourage patient to monitor pain and request assistance   Assess pain using appropriate pain scale  Taken 2/20/2025 0903  Verbalizes/displays adequate comfort level or baseline comfort level:   Encourage patient to monitor pain and request assistance   Assess pain using appropriate pain scale     Problem: Skin/Tissue Integrity  Goal: Skin integrity remains intact  Description: 1.  Monitor for areas of redness and/or skin breakdown  2.  Assess vascular access sites hourly  3.  Every 4-6 hours minimum:  Change oxygen saturation probe site  4.  Every 4-6 hours:  If on nasal continuous positive airway

## 2025-02-21 NOTE — PLAN OF CARE
Patient provided a COPD Educational Folder that includes the following materials:     [x]  [employer] Booklet: Managing your COPD  [x]  ALA: Getting the Most Out of Medication Delivery Devices  [x]  ALA: My COPD Action Plan  [x]  [employer] [facility]: [facility]   [x]  Smoking Cessation Classes  [x]  Outpatient Spiritual Care Services  [x]  [employer]: Signs of COPD    PATIENT/CAREGIVER TEACHING:   Level of patient/caregiver understanding able to:   [x] Verbalize understanding   [] Demonstrate understanding       [] Teach back        [] Needs reinforcement     []  Other:     Electronically signed by Abran Rangel RN on 2/21/2025 at 12:42 AM

## 2025-02-21 NOTE — PROGRESS NOTES
[facility]                                 Pulmonary/Critical care  Consult /Progress Note :                                                                  CC :worsening SOB ,caviatry lesion     HISTORY OF PRESENT ILLNESS:   Doing better  Mild cough  No CP  Yellow sputum    AFB + and enterobacter colacae 2 weeks ago  Developed A fib     PHYSICAL EXAM:  Vitals:    02/21/25 0738   BP:    Pulse:    Resp:    Temp:    SpO2: 94%     Gen: No distress.   Eyes: PERRL. No sclera icterus. No conjunctival injection.   ENT: No discharge. Pharynx clear.   Neck: Trachea midline. No obvious mass.    Resp: Rhonchi   CV: Regular rate. Regular rhythm. No murmur or rub. No edema. Peripheral pulses are 2+.  Capillary refill is less than 3 seconds.  GI: Non-tender. Non-distended. No hernia.   Skin: Warm and dry. No nodule on exposed extremities.   Lymph: No cervical LAD. No supraclavicular LAD.   M/S: No cyanosis. No joint deformity. No clubbing.   Neuro: Awake. Alert. Moves all four extremities.   Psych: Oriented x 3. No anxiety.     LABS:  CBC:   Recent Labs     02/19/25  0527 02/20/25  0526   WBC 9.7 11.3*   HGB 9.1* 9.7*   HCT 27.8* 29.5*   MCV 81.9 83.1   * 538*     BMP:   Recent Labs     02/19/25  0527 02/20/25  0526    137   K 3.5 3.8    103   CO2 26 24   PHOS  --  3.1   BUN 17 19   CREATININE 0.8 0.7*     LIVER PROFILE:   No results for input(s): \"AST\", \"ALT\", \"LIPASE\", \"AMYLASE\", \"BILIDIR\", \"BILITOT\", \"ALKPHOS\" in the last 72 hours.    Invalid input(s): \"ALB\"    PT/INR: No results for input(s): \"PROTIME\", \"INR\" in the last 72 hours.  APTT: No results for input(s): \"APTT\" in the last 72 hours.  UA:  No results for input(s): \"NITRITE\", \"COLORU\", \"PHUR\", \"LABCAST\", \"WBCUA\", \"RBCUA\", \"MUCUS\", \"TRICHOMONAS\", \"YEAST\", \"BACTERIA\", \"CLARITYU\", \"SPECGRAV\", \"LEUKOCYTESUR\", \"UROBILINOGEN\", \"BILIRUBINUR\", \"BLOODU\", \"GLUCOSEU\",

## 2025-02-21 NOTE — PROGRESS NOTES
Patient and daughter were given discharge instructions both verbally and written along with follow up appointments, and a hard copy of new medications purposes and side effects, both expressed full understanding.

## 2025-02-21 NOTE — PROGRESS NOTES
Patient left via wheelchair, via car accompanied by family, Case management has given patient their oxygen with instructions, patient and family expressed full understanding of the use of oxygen. All belongings sent.

## 2025-02-21 NOTE — FLOWSHEET NOTE
02/21/25 0720   Handoff   Communication Given Shift Handoff   Handoff Given To Lucia TRIVEDI   Handoff Received From Mini   Handoff Communication Face to Face;At bedside   Time Handoff Given 0720   End of Shift Check Performed Yes     Pt awake in bed.  Denies needs. Call light within reach.

## 2025-02-21 NOTE — PROGRESS NOTES
Bed side report given to FAROOQ Morse. Patient awake in bed, denies needs. Call light within reach. Bed alarm on.    Left message for mom Jacinda Hunter to call back.

## 2025-02-21 NOTE — PROGRESS NOTES
[facility]   COPD PROGRAM      NAME:  Lucio Padilla  AGE: 79 y.o.   GENDER: male  : 1945  TODAY'S DATE:  2025    Subjective:     VISIT TYPE: Education    ADMIT DATE: 2025    PAST MEDICAL HISTORY:      Diagnosis Date    Arthritis     COPD (chronic obstructive pulmonary disease) ([facility])     Dizziness 2014    Emphysema lung ([facility])     Hypertension      HOME MEDICATIONS:  Prior to Admission medications    Medication Sig Start Date End Date Taking? Authorizing Provider   metoprolol succinate (TOPROL XL) 25 MG extended release tablet Take 1 tablet by mouth daily 25  Yes Yasmin Quiroz PA   benzonatate (TESSALON) 100 MG capsule Take 1 capsule by mouth 3 times daily as needed for Cough 25 Yes Yasmin Quiroz PA   apixaban (ELIQUIS) 2.5 MG TABS tablet Take 1 tablet by mouth 2 times daily 2/21/25 3/23/25 Yes Yasmin Quiroz PA   cefdinir (OMNICEF) 300 MG capsule Take 1 capsule by mouth 2 times daily for 5 days 25 Yes Yasmin Quirzo PA   amiodarone (CORDARONE) 200 MG tablet Take 1 tablet by mouth 2 times daily for 14 days, THEN 1 tablet daily. 25 Yes Yasmin Quiroz PA   folic acid (FOLVITE) 1 MG tablet Take 1 tablet by mouth daily   Yes Sayra Piña MD   vitamin B-12 (CYANOCOBALAMIN) 1000 MCG tablet Take 0.5 tablets by mouth daily   Yes Sayra Piña MD   fluocinonide (LIDEX) 0.05 % cream Apply topically 2 times daily as needed Apply topically 2 times daily. To dry skin to Bilateral lower and upper extremities   Yes Sayra Piña MD   fluticasone-salmeterol (ADVAIR) 500-50 MCG/ACT AEPB diskus inhaler Inhale 1 puff into the lungs in the morning and 1 puff in the evening.   Yes Sayra Piña MD   albuterol (PROVENTIL) (2.5 MG/3ML) 0.083% nebulizer solution Take 3 mLs by nebulization every 6 hours as needed for Wheezing   Yes Sayra Piña MD   acetaminophen (TYLENOL) 500 MG tablet Take  1 tablet by mouth every 6 hours as needed for Pain   Yes Sayra Piña MD   tiotropium (SPIRIVA) 18 MCG inhalation capsule Inhale 1 capsule into the lungs daily   Yes Sayra Piña MD   fluticasone-umeclidin-vilant (TRELEGY ELLIPTA) 200-62.5-25 MCG/ACT AEPB inhaler Inhale 1 puff into the lungs daily 7/8/24  Yes Karl Salinas DO   guaiFENesin (MUCINEX) 600 MG extended release tablet Take 1 tablet by mouth 2 times daily 1/9/24  Yes Elbert Monet MD   methotrexate (RHEUMATREX) 2.5 MG chemo tablet Take 6 tablets by mouth once a week On Sundays   Yes Sayra Piña MD   gabapentin (NEURONTIN) 100 MG capsule Take 2 capsules by mouth 3 times daily.   Yes Sayra Piña MD   naproxen (NAPROSYN) 500 MG tablet Take 1 tablet by mouth 2 times daily (with meals)   Yes Sayra Piña MD   loratadine (CLARITIN) 10 MG tablet Take 1 tablet by mouth daily   Yes Sayra Piña MD   vitamin D 25 MCG (1000 UT) CAPS Take 1 capsule by mouth daily   Yes Sayra Piña MD   Omega-3 Fatty Acids (FISH OIL) 1000 MG capsule Take 2 capsules by mouth daily   Yes Sayra Piña MD   albuterol sulfate HFA (PROAIR HFA) 108 (90 Base) MCG/ACT inhaler Inhale 2 puffs into the lungs every 4 hours as needed for Wheezing or Shortness of Breath 4/24/23  Yes Karl Salinas DO   alendronate (FOSAMAX) 70 MG tablet Take 1 tablet by mouth every 7 days  Patient not taking: Reported on 2/18/2025    Sayra Piña MD      Objective:     ADMISSION DIAGNOSIS:   Generalized weakness [R53.1]  UTE (acute kidney injury) [N17.9]  Cavitary lesion of lung [J98.4]    CXR Findings:  No results found.    Assessment:     Patient resting in bed at this time on  1 L O2.  Pt denies shortness of breath; no complaints of chest pain. Stated at home was having symptoms of cough and lethargy.     Scheduled follow-up appointment with Dr. Lu on 2/27/2025 at 3pm.     Provided COPD Nurse

## 2025-02-21 NOTE — PLAN OF CARE
Problem: Discharge Planning  Goal: Discharge to home or other facility with appropriate resources  2/21/2025 1605 by Lucia Vasquez RN  Outcome: Completed  2/21/2025 1143 by Lucia Vasquez RN  Outcome: Progressing  Flowsheets (Taken 2/21/2025 1001)  Discharge to home or other facility with appropriate resources:   Identify barriers to discharge with patient and caregiver   Arrange for needed discharge resources and transportation as appropriate   Identify discharge learning needs (meds, wound care, etc)   Arrange for interpreters to assist at discharge as needed   Refer to discharge planning if patient needs post-hospital services based on physician order or complex needs related to functional status, cognitive ability or social support system     Problem: Safety - Adult  Goal: Free from fall injury  2/21/2025 1605 by Lucia Vasquez RN  Outcome: Completed  2/21/2025 1143 by Lucia Vasquez RN  Outcome: Progressing     Problem: Pain  Goal: Verbalizes/displays adequate comfort level or baseline comfort level  2/21/2025 1605 by Lucia Vasquez, RN  Outcome: Completed  Flowsheets (Taken 2/21/2025 1603)  Verbalizes/displays adequate comfort level or baseline comfort level:   Encourage patient to monitor pain and request assistance   Assess pain using appropriate pain scale   Administer analgesics based on type and severity of pain and evaluate response   Implement non-pharmacological measures as appropriate and evaluate response   Consider cultural and social influences on pain and pain management   Notify Licensed Independent Practitioner if interventions unsuccessful or patient reports new pain  2/21/2025 1143 by Lucia Vasquez, RN  Outcome: Progressing  Flowsheets  Taken 2/21/2025 0945 by Lucia Vasquez, RN  Verbalizes/displays adequate comfort level or baseline comfort level:   Encourage patient to monitor pain and request assistance   Assess pain using appropriate pain scale    needed   Discuss catheterization for long term situations as appropriate     Problem: Metabolic/Fluid and Electrolytes - Adult  Goal: Electrolytes maintained within normal limits  2/21/2025 1605 by Lucia Vasquez, RN  Outcome: Completed  2/21/2025 1143 by Lucia Vasquez, RN  Outcome: Progressing  Flowsheets (Taken 2/21/2025 1001)  Electrolytes maintained within normal limits:   Monitor labs and assess patient for signs and symptoms of electrolyte imbalances   Administer electrolyte replacement as ordered   Monitor response to electrolyte replacements, including repeat lab results as appropriate   Fluid restriction as ordered   Instruct patient on fluid and nutrition restrictions as appropriate

## 2025-02-21 NOTE — FLOWSHEET NOTE
02/20/25 2027   Vital Signs   Temp 100 °F (37.8 °C)   Temp Source Oral   Pulse 99   Heart Rate Source Monitor   Respirations 19   /69   MAP (Calculated) 82   BP Location Right upper arm   BP Method Automatic   Patient Position Lying right side   Pain Assessment   Pain Assessment None - Denies Pain   Care Plan - Pain Goals   Verbalizes/displays adequate comfort level or baseline comfort level Encourage patient to monitor pain and request assistance;Assess pain using appropriate pain scale   Opioid-Induced Sedation   POSS Score 1   RASS   Maria Agitation Sedation Scale (RASS) 0   Oxygen Therapy   SpO2 90 %   O2 Device None (Room air)     PM assessment completed. Alert and oriented x4.Calm and ccoperative. No complaints of pain or discomfort voiced at this time. No signs or symptoms of distress noted. On room air, 90% sat, denies SOB, denies chest pain. Patient tolerated PM medications well, pills whole with water. Respirations easy and even. Bed in lowest position, bed alarm in place and functioning properly, bed rails x2 up,  Call light within reach.     Bedside Mobility Assessment Tool (BMAT):     Assessment Level 1- Sit and Shake    1. From a semi-reclined position, ask patient to sit up and rotate to a seated position at the side of the bed. Can use the bedrail.    2. Ask patient to reach out and grab your hand and shake making sure patient reaches across his/her midline.   Pass- Patient is able to come to a seated position, maintain core strength. Maintains seated balance while reaching across midline. Move on to Assessment Level 2.     Assessment Level 2- Stretch and Point   1. With patient in seated position at the side of the bed, have patient place both feet on the floor (or stool) with knees no higher than hips.    2. Ask patient to stretch one leg and straighten the knee, then bend the ankle/flex and point the toes. If appropriate, repeat with the other leg.   Pass- Patient is able to

## 2025-02-23 NOTE — PROGRESS NOTES
Physician Progress Note      PATIENT:               ALEXIS BRANDON  [facility] #:                  [id number]  :                       1945  ADMIT DATE:       2025 9:55 AM  DISCH DATE:        2025 4:46 PM  RESPONDING  PROVIDER #:        Markus Mendez MD          QUERY TEXT:    Patient admitted with Pneumonia, noted to have Paroxysmal atrial fibrillation,   new diagnosis and is maintained on Eliquis. If possible, please document in   progress notes and discharge summary if you are evaluating and/or treating any   of the following:?  ?  The medical record reflects the following:  Risk Factors: Paroxysmal atrial fibrillation, new diagnosis  Clinical Indicators:[id number] score of 3 (age, hypertension) merits   anticoagulation , Given degree of PACs he is having, triggering pAT/AF, will   need improved suppression that likely BB won't provide,  - the patient   went back in atrial fibrillation and was administered 2.5 mg IV metoprolol in   AM and subsequently converted to sinus rhythm with PACs.  Treatment: Recommend start Eliquis 2.5 mg twice daily this evening and may   stop aspirin. -Continue metoprolol XL 25 mg once daily. Required IV metoprolol   this AM. Will start amiodarone 400 mg BID x 1 week, with plan to wean to 200   mg x 2 weeks, then 200 mg once daily thereafter.-2 week heart monitor at [place],   [facility] consult  Options provided:  -- Secondary hypercoagulable state in a patient with atrial fibrillation  -- Other - I will add my own diagnosis  -- Disagree - Not applicable / Not valid  -- Disagree - Clinically unable to determine / Unknown  -- Refer to Clinical Documentation Reviewer    PROVIDER RESPONSE TEXT:    This patient has secondary hypercoagulable state in a patient with atrial   fibrillation.    Query created by: Yane Emerson on 2025 11:49 AM      Electronically signed by:  Markus Mendez MD 2025 7:20 PM

## 2025-03-03 ENCOUNTER — OFFICE VISIT (OUTPATIENT)
Dept: PULMONOLOGY | Age: 80
End: 2025-03-03
Payer: MEDICARE

## 2025-03-03 VITALS
TEMPERATURE: 97 F | SYSTOLIC BLOOD PRESSURE: 134 MMHG | RESPIRATION RATE: 18 BRPM | BODY MASS INDEX: 17.63 KG/M2 | DIASTOLIC BLOOD PRESSURE: 60 MMHG | WEIGHT: 119 LBS | HEIGHT: 69 IN | OXYGEN SATURATION: 94 % | HEART RATE: 54 BPM

## 2025-03-03 DIAGNOSIS — J43.1 PANLOBULAR EMPHYSEMA (HCC): Primary | ICD-10-CM

## 2025-03-03 DIAGNOSIS — R93.89 ABNORMAL CT OF THE CHEST: ICD-10-CM

## 2025-03-03 DIAGNOSIS — A31.9 ATYPICAL MYCOBACTERIAL DISEASE: ICD-10-CM

## 2025-03-03 DIAGNOSIS — J96.11 CHRONIC RESPIRATORY FAILURE WITH HYPOXIA (HCC): ICD-10-CM

## 2025-03-03 DIAGNOSIS — M05.9 RHEUMATOID ARTHRITIS WITH POSITIVE RHEUMATOID FACTOR, INVOLVING UNSPECIFIED SITE (HCC): ICD-10-CM

## 2025-03-03 PROCEDURE — 3075F SYST BP GE 130 - 139MM HG: CPT | Performed by: INTERNAL MEDICINE

## 2025-03-03 PROCEDURE — 1159F MED LIST DOCD IN RCRD: CPT | Performed by: INTERNAL MEDICINE

## 2025-03-03 PROCEDURE — 1123F ACP DISCUSS/DSCN MKR DOCD: CPT | Performed by: INTERNAL MEDICINE

## 2025-03-03 PROCEDURE — 99214 OFFICE O/P EST MOD 30 MIN: CPT | Performed by: INTERNAL MEDICINE

## 2025-03-03 PROCEDURE — G2211 COMPLEX E/M VISIT ADD ON: HCPCS | Performed by: INTERNAL MEDICINE

## 2025-03-03 PROCEDURE — 3078F DIAST BP <80 MM HG: CPT | Performed by: INTERNAL MEDICINE

## 2025-03-03 NOTE — PROGRESS NOTES
Chief complaint  This is a 79 y.o. year old male  who comes to see me with a chief complaint of   Chief Complaint   Patient presents with    Follow-up    Emphysema     In hospital a week ago- Afib     Breathing Problem       HPI  Here with a cc of abnormal CT Chest, emphysema    He was admitted once again for breathing issues and cavitary process of the lungs.  Does not appear to have started therapy for atypical mycobacterial infections but saw ID (Dr Valencia) when admitted.  He is now on oxygen and is not doing well.  He sees ID tomorrow to talk about therapy.  He says he does not want to \"be a guinea pig.\"  Daughter is present.  She has noticed decline.  He remains on trelegy and albuterol.        Current Outpatient Medications:     metoprolol succinate (TOPROL XL) 25 MG extended release tablet, Take 1 tablet by mouth daily, Disp: 30 tablet, Rfl: 0    apixaban (ELIQUIS) 2.5 MG TABS tablet, Take 1 tablet by mouth 2 times daily, Disp: 60 tablet, Rfl: 0    amiodarone (CORDARONE) 200 MG tablet, Take 1 tablet by mouth 2 times daily for 14 days, THEN 1 tablet daily., Disp: 58 tablet, Rfl: 0    alendronate (FOSAMAX) 70 MG tablet, Take 1 tablet by mouth every 7 days, Disp: , Rfl:     folic acid (FOLVITE) 1 MG tablet, Take 1 tablet by mouth daily, Disp: , Rfl:     vitamin B-12 (CYANOCOBALAMIN) 1000 MCG tablet, Take 0.5 tablets by mouth daily, Disp: , Rfl:     fluocinonide (LIDEX) 0.05 % cream, Apply topically 2 times daily as needed Apply topically 2 times daily. To dry skin to Bilateral lower and upper extremities, Disp: , Rfl:     fluticasone-salmeterol (ADVAIR) 500-50 MCG/ACT AEPB diskus inhaler, Inhale 1 puff into the lungs in the morning and 1 puff in the evening., Disp: , Rfl:     albuterol (PROVENTIL) (2.5 MG/3ML) 0.083% nebulizer solution, Take 3 mLs by nebulization every 6 hours as needed for Wheezing, Disp: , Rfl:     acetaminophen (TYLENOL) 500 MG tablet, Take 1 tablet by mouth every 6 hours as needed for

## 2025-03-03 NOTE — PATIENT INSTRUCTIONS
Consider taking antibiotics from Dr Valencia    Try to get off of the methotrexate     Will get follow up CT after you start therapy     Follow up in 2 months

## 2025-03-14 ENCOUNTER — TELEPHONE (OUTPATIENT)
Dept: INFECTIOUS DISEASES | Age: 80
End: 2025-03-14

## 2025-03-14 NOTE — TELEPHONE ENCOUNTER
Received a call from patient's daughter.  She is calling asking for an update on plan for her father and if results were received.     If you need to call daughter, her number is [phone].    Please advise.  Thank you.